# Patient Record
Sex: MALE | Race: WHITE | Employment: OTHER | URBAN - METROPOLITAN AREA
[De-identification: names, ages, dates, MRNs, and addresses within clinical notes are randomized per-mention and may not be internally consistent; named-entity substitution may affect disease eponyms.]

---

## 2018-02-02 ENCOUNTER — HOSPITAL ENCOUNTER (OUTPATIENT)
Dept: CT IMAGING | Age: 73
Discharge: HOME | End: 2018-02-04
Payer: MEDICARE

## 2018-02-02 ENCOUNTER — HOSPITAL ENCOUNTER (OUTPATIENT)
Age: 73
Discharge: HOME | End: 2018-02-02
Payer: MEDICARE

## 2018-02-02 DIAGNOSIS — R27.8 TRUNCAL ATAXIA: ICD-10-CM

## 2018-02-02 DIAGNOSIS — G93.40 ENCEPHALOPATHY: ICD-10-CM

## 2018-02-02 DIAGNOSIS — I95.1 ORTHOSTASIS: ICD-10-CM

## 2018-02-02 DIAGNOSIS — I69.30 MULTI INFARCT STATE: ICD-10-CM

## 2018-02-02 LAB
FOLATE: >20 NG/ML
T. PALLIDUM, IGG: NONREACTIVE
THYROXINE, FREE: 1.13 NG/DL (ref 0.93–1.7)
TSH SERPL DL<=0.05 MIU/L-ACNC: 2.35 MIU/L (ref 0.3–5)
VITAMIN B-12: 628 PG/ML (ref 232–1245)

## 2018-02-02 PROCEDURE — 86780 TREPONEMA PALLIDUM: CPT

## 2018-02-02 PROCEDURE — 70450 CT HEAD/BRAIN W/O DYE: CPT

## 2018-02-02 PROCEDURE — 84443 ASSAY THYROID STIM HORMONE: CPT

## 2018-02-02 PROCEDURE — 82607 VITAMIN B-12: CPT

## 2018-02-02 PROCEDURE — 84439 ASSAY OF FREE THYROXINE: CPT

## 2018-02-02 PROCEDURE — 36415 COLL VENOUS BLD VENIPUNCTURE: CPT

## 2018-02-02 PROCEDURE — 82746 ASSAY OF FOLIC ACID SERUM: CPT

## 2018-03-07 ENCOUNTER — APPOINTMENT (OUTPATIENT)
Dept: GENERAL RADIOLOGY | Age: 73
DRG: 561 | End: 2018-03-07
Payer: MEDICARE

## 2018-03-07 ENCOUNTER — APPOINTMENT (OUTPATIENT)
Dept: CT IMAGING | Age: 73
DRG: 561 | End: 2018-03-07
Payer: MEDICARE

## 2018-03-07 ENCOUNTER — HOSPITAL ENCOUNTER (INPATIENT)
Age: 73
LOS: 2 days | Discharge: HOME | DRG: 561 | End: 2018-03-10
Attending: EMERGENCY MEDICINE | Admitting: SURGERY
Payer: MEDICARE

## 2018-03-07 DIAGNOSIS — S09.90XA CLOSED HEAD INJURY, INITIAL ENCOUNTER: ICD-10-CM

## 2018-03-07 DIAGNOSIS — S42.201A CLOSED FRACTURE OF PROXIMAL END OF RIGHT HUMERUS, UNSPECIFIED FRACTURE MORPHOLOGY, INITIAL ENCOUNTER: Primary | ICD-10-CM

## 2018-03-07 DIAGNOSIS — R55 SYNCOPE AND COLLAPSE: ICD-10-CM

## 2018-03-07 DIAGNOSIS — I48.91 ATRIAL FIBRILLATION, UNSPECIFIED TYPE (HCC): ICD-10-CM

## 2018-03-07 DIAGNOSIS — S42.291A FRACTURE OF HUMERAL HEAD, CLOSED, RIGHT, INITIAL ENCOUNTER: ICD-10-CM

## 2018-03-07 PROBLEM — S42.291P: Status: ACTIVE | Noted: 2018-03-07

## 2018-03-07 LAB
ABSOLUTE EOS #: 0.4 K/UL (ref 0–0.4)
ABSOLUTE IMMATURE GRANULOCYTE: ABNORMAL K/UL (ref 0–0.3)
ABSOLUTE LYMPH #: 1.5 K/UL (ref 1–4.8)
ABSOLUTE MONO #: 1 K/UL (ref 0.1–1.3)
ANION GAP SERPL CALCULATED.3IONS-SCNC: 14 MMOL/L (ref 9–17)
BASOPHILS # BLD: 1 % (ref 0–2)
BASOPHILS ABSOLUTE: 0.1 K/UL (ref 0–0.2)
BUN BLDV-MCNC: 15 MG/DL (ref 8–23)
BUN/CREAT BLD: ABNORMAL (ref 9–20)
CALCIUM SERPL-MCNC: 10.2 MG/DL (ref 8.6–10.4)
CHLORIDE BLD-SCNC: 101 MMOL/L (ref 98–107)
CO2: 26 MMOL/L (ref 20–31)
CREAT SERPL-MCNC: 0.93 MG/DL (ref 0.7–1.2)
DIFFERENTIAL TYPE: ABNORMAL
EKG ATRIAL RATE: 57 BPM
EKG Q-T INTERVAL: 418 MS
EKG QRS DURATION: 96 MS
EKG QTC CALCULATION (BAZETT): 424 MS
EKG R AXIS: 28 DEGREES
EKG T AXIS: 43 DEGREES
EKG VENTRICULAR RATE: 62 BPM
EOSINOPHILS RELATIVE PERCENT: 3 % (ref 0–4)
GFR AFRICAN AMERICAN: >60 ML/MIN
GFR NON-AFRICAN AMERICAN: >60 ML/MIN
GFR SERPL CREATININE-BSD FRML MDRD: ABNORMAL ML/MIN/{1.73_M2}
GFR SERPL CREATININE-BSD FRML MDRD: ABNORMAL ML/MIN/{1.73_M2}
GLUCOSE BLD-MCNC: 116 MG/DL (ref 70–99)
HCT VFR BLD CALC: 44.4 % (ref 41–53)
HEMOGLOBIN: 14.9 G/DL (ref 13.5–17.5)
IMMATURE GRANULOCYTES: ABNORMAL %
LYMPHOCYTES # BLD: 11 % (ref 24–44)
MCH RBC QN AUTO: 31.5 PG (ref 26–34)
MCHC RBC AUTO-ENTMCNC: 33.5 G/DL (ref 31–37)
MCV RBC AUTO: 93.9 FL (ref 80–100)
MONOCYTES # BLD: 7 % (ref 1–7)
NRBC AUTOMATED: ABNORMAL PER 100 WBC
PDW BLD-RTO: 13.2 % (ref 11.5–14.9)
PLATELET # BLD: 338 K/UL (ref 150–450)
PLATELET ESTIMATE: ABNORMAL
PMV BLD AUTO: 8.8 FL (ref 6–12)
POTASSIUM SERPL-SCNC: 4 MMOL/L (ref 3.7–5.3)
RBC # BLD: 4.73 M/UL (ref 4.5–5.9)
RBC # BLD: ABNORMAL 10*6/UL
SEG NEUTROPHILS: 78 % (ref 36–66)
SEGMENTED NEUTROPHILS ABSOLUTE COUNT: 10.8 K/UL (ref 1.3–9.1)
SODIUM BLD-SCNC: 141 MMOL/L (ref 135–144)
TROPONIN INTERP: NORMAL
TROPONIN T: <0.03 NG/ML
WBC # BLD: 13.7 K/UL (ref 3.5–11)
WBC # BLD: ABNORMAL 10*3/UL

## 2018-03-07 PROCEDURE — 6360000002 HC RX W HCPCS: Performed by: EMERGENCY MEDICINE

## 2018-03-07 PROCEDURE — G0378 HOSPITAL OBSERVATION PER HR: HCPCS

## 2018-03-07 PROCEDURE — 84484 ASSAY OF TROPONIN QUANT: CPT

## 2018-03-07 PROCEDURE — 73060 X-RAY EXAM OF HUMERUS: CPT

## 2018-03-07 PROCEDURE — 6370000000 HC RX 637 (ALT 250 FOR IP): Performed by: SURGERY

## 2018-03-07 PROCEDURE — 2580000003 HC RX 258: Performed by: SURGERY

## 2018-03-07 PROCEDURE — 93005 ELECTROCARDIOGRAM TRACING: CPT

## 2018-03-07 PROCEDURE — 70450 CT HEAD/BRAIN W/O DYE: CPT

## 2018-03-07 PROCEDURE — 85025 COMPLETE CBC W/AUTO DIFF WBC: CPT

## 2018-03-07 PROCEDURE — 71045 X-RAY EXAM CHEST 1 VIEW: CPT

## 2018-03-07 PROCEDURE — 96374 THER/PROPH/DIAG INJ IV PUSH: CPT

## 2018-03-07 PROCEDURE — 99285 EMERGENCY DEPT VISIT HI MDM: CPT

## 2018-03-07 PROCEDURE — 36415 COLL VENOUS BLD VENIPUNCTURE: CPT

## 2018-03-07 PROCEDURE — 80048 BASIC METABOLIC PNL TOTAL CA: CPT

## 2018-03-07 RX ORDER — METOPROLOL TARTRATE 50 MG/1
50 TABLET, FILM COATED ORAL 2 TIMES DAILY
Status: DISCONTINUED | OUTPATIENT
Start: 2018-03-07 | End: 2018-03-07 | Stop reason: CLARIF

## 2018-03-07 RX ORDER — SENNOSIDES 8.6 MG
1300 CAPSULE ORAL 2 TIMES DAILY
Status: DISCONTINUED | OUTPATIENT
Start: 2018-03-07 | End: 2018-03-07

## 2018-03-07 RX ORDER — SODIUM CHLORIDE 0.9 % (FLUSH) 0.9 %
10 SYRINGE (ML) INJECTION EVERY 12 HOURS SCHEDULED
Status: DISCONTINUED | OUTPATIENT
Start: 2018-03-07 | End: 2018-03-10 | Stop reason: HOSPADM

## 2018-03-07 RX ORDER — LOSARTAN POTASSIUM AND HYDROCHLOROTHIAZIDE 12.5; 1 MG/1; MG/1
1 TABLET ORAL DAILY
Status: DISCONTINUED | OUTPATIENT
Start: 2018-03-07 | End: 2018-03-10 | Stop reason: HOSPADM

## 2018-03-07 RX ORDER — ASPIRIN 325 MG
162 TABLET ORAL ONCE
Status: DISCONTINUED | OUTPATIENT
Start: 2018-03-07 | End: 2018-03-07 | Stop reason: CLARIF

## 2018-03-07 RX ORDER — ATORVASTATIN CALCIUM 20 MG/1
20 TABLET, FILM COATED ORAL DAILY
Status: DISCONTINUED | OUTPATIENT
Start: 2018-03-07 | End: 2018-03-10 | Stop reason: HOSPADM

## 2018-03-07 RX ORDER — HYDROCODONE BITARTRATE AND ACETAMINOPHEN 5; 325 MG/1; MG/1
2 TABLET ORAL EVERY 4 HOURS PRN
Status: DISCONTINUED | OUTPATIENT
Start: 2018-03-07 | End: 2018-03-10 | Stop reason: HOSPADM

## 2018-03-07 RX ORDER — HYDROCODONE BITARTRATE AND ACETAMINOPHEN 5; 325 MG/1; MG/1
1 TABLET ORAL EVERY 4 HOURS PRN
Status: DISCONTINUED | OUTPATIENT
Start: 2018-03-07 | End: 2018-03-10 | Stop reason: HOSPADM

## 2018-03-07 RX ORDER — M-VIT,TX,IRON,MINS/CALC/FOLIC 27MG-0.4MG
1 TABLET ORAL DAILY
Status: DISCONTINUED | OUTPATIENT
Start: 2018-03-07 | End: 2018-03-10 | Stop reason: HOSPADM

## 2018-03-07 RX ORDER — ACETAMINOPHEN 325 MG/1
650 TABLET ORAL EVERY 4 HOURS PRN
Status: DISCONTINUED | OUTPATIENT
Start: 2018-03-07 | End: 2018-03-10 | Stop reason: HOSPADM

## 2018-03-07 RX ORDER — ASPIRIN 81 MG/1
162 TABLET ORAL DAILY
Status: DISCONTINUED | OUTPATIENT
Start: 2018-03-08 | End: 2018-03-10 | Stop reason: HOSPADM

## 2018-03-07 RX ORDER — HYDROCHLOROTHIAZIDE 25 MG/1
12.5 TABLET ORAL DAILY
Status: DISCONTINUED | OUTPATIENT
Start: 2018-03-07 | End: 2018-03-07 | Stop reason: CLARIF

## 2018-03-07 RX ORDER — LOSARTAN POTASSIUM AND HYDROCHLOROTHIAZIDE 12.5; 1 MG/1; MG/1
1 TABLET ORAL DAILY
Status: DISCONTINUED | OUTPATIENT
Start: 2018-03-07 | End: 2018-03-07 | Stop reason: CLARIF

## 2018-03-07 RX ORDER — SENNOSIDES 8.6 MG
1300 CAPSULE ORAL 2 TIMES DAILY
Status: DISCONTINUED | OUTPATIENT
Start: 2018-03-07 | End: 2018-03-10 | Stop reason: HOSPADM

## 2018-03-07 RX ORDER — CALCIUM CARBONATE 200(500)MG
1 TABLET,CHEWABLE ORAL 2 TIMES DAILY PRN
Status: DISCONTINUED | OUTPATIENT
Start: 2018-03-07 | End: 2018-03-10 | Stop reason: HOSPADM

## 2018-03-07 RX ORDER — METOPROLOL TARTRATE 100 MG/1
50 TABLET ORAL 2 TIMES DAILY
Status: DISCONTINUED | OUTPATIENT
Start: 2018-03-07 | End: 2018-03-10 | Stop reason: HOSPADM

## 2018-03-07 RX ORDER — AMLODIPINE BESYLATE AND ATORVASTATIN CALCIUM 10; 20 MG/1; MG/1
1 TABLET, FILM COATED ORAL DAILY
Status: DISCONTINUED | OUTPATIENT
Start: 2018-03-07 | End: 2018-03-07 | Stop reason: CLARIF

## 2018-03-07 RX ORDER — SODIUM CHLORIDE 0.9 % (FLUSH) 0.9 %
10 SYRINGE (ML) INJECTION PRN
Status: DISCONTINUED | OUTPATIENT
Start: 2018-03-07 | End: 2018-03-10 | Stop reason: HOSPADM

## 2018-03-07 RX ORDER — AMLODIPINE BESYLATE 10 MG/1
10 TABLET ORAL DAILY
Status: DISCONTINUED | OUTPATIENT
Start: 2018-03-07 | End: 2018-03-10 | Stop reason: HOSPADM

## 2018-03-07 RX ORDER — LOSARTAN POTASSIUM 100 MG/1
100 TABLET ORAL DAILY
Status: DISCONTINUED | OUTPATIENT
Start: 2018-03-07 | End: 2018-03-07 | Stop reason: CLARIF

## 2018-03-07 RX ADMIN — Medication 10 ML: at 21:44

## 2018-03-07 RX ADMIN — HYDROCODONE BITARTRATE AND ACETAMINOPHEN 2 TABLET: 5; 325 TABLET ORAL at 16:28

## 2018-03-07 RX ADMIN — HYDROCODONE BITARTRATE AND ACETAMINOPHEN 2 TABLET: 5; 325 TABLET ORAL at 20:48

## 2018-03-07 RX ADMIN — HYDROMORPHONE HYDROCHLORIDE 0.5 MG: 1 INJECTION, SOLUTION INTRAMUSCULAR; INTRAVENOUS; SUBCUTANEOUS at 12:56

## 2018-03-07 RX ADMIN — LOSARTAN POTASSIUM AND HYDROCHLOROTHIAZIDE 1 TABLET: 12.5; 1 TABLET ORAL at 21:47

## 2018-03-07 RX ADMIN — METOPROLOL TARTRATE 50 MG: 100 TABLET ORAL at 21:47

## 2018-03-07 ASSESSMENT — PAIN SCALES - GENERAL
PAINLEVEL_OUTOF10: 9
PAINLEVEL_OUTOF10: 2
PAINLEVEL_OUTOF10: 3
PAINLEVEL_OUTOF10: 5
PAINLEVEL_OUTOF10: 3
PAINLEVEL_OUTOF10: 7
PAINLEVEL_OUTOF10: 2

## 2018-03-07 ASSESSMENT — PAIN DESCRIPTION - PAIN TYPE
TYPE: ACUTE PAIN

## 2018-03-07 ASSESSMENT — PAIN DESCRIPTION - LOCATION
LOCATION: SHOULDER

## 2018-03-07 ASSESSMENT — PAIN DESCRIPTION - ORIENTATION
ORIENTATION: RIGHT

## 2018-03-07 ASSESSMENT — PAIN DESCRIPTION - DESCRIPTORS
DESCRIPTORS: ACHING;DULL
DESCRIPTORS: ACHING

## 2018-03-07 NOTE — ED NOTES
Bed: 12  Expected date:   Expected time:   Means of arrival:   Comments:  Carol Goltz, RN  03/07/18 112

## 2018-03-07 NOTE — ED NOTES
Report given to [facility], [address] [facility]. Pt is going to room 6071.      Ilana Valles RN  03/07/18 8302

## 2018-03-07 NOTE — ED PROVIDER NOTES
tablet Take 162 mg by mouth once       amLODIPine-atorvastatatin (CADUET) 10-20 MG per tablet Take 1 tablet by mouth daily. losartan-hydrochlorothiazide (HYZAAR) 100-12.5 MG per tablet Take 1 tablet by mouth daily. MULTIPLE VITAMINS PO Take 1 tablet by mouth daily. methylPREDNISolone (MEDROL DOSEPACK) 4 MG tablet Take 4 mg by mouth daily Use as directed, dose pack      traMADol (ULTRAM) 50 MG tablet Take 50 mg by mouth daily as needed for Pain. GABAPENTIN PO   Take 200 mg by mouth daily Indications: can have up to 300mg if he needs it       acetaminophen (TYLENOL ARTHRITIS PAIN) 650 MG CR tablet Take 1,300 mg by mouth 2 times daily       calcium carbonate (TUMS) 500 MG chewable tablet Take 1 tablet by mouth 2 times daily as needed for Heartburn. tamsulosin (FLOMAX) 0.4 MG capsule Take 1 capsule by mouth daily. Qty: 30 capsule, Refills: 11    Associated Diagnoses: Incontinence; Nocturia; Frequency; BPH (benign prostatic hyperplasia)           ALLERGIES     is allergic to morphine. FAMILY HISTORY     has no family status information on file. SOCIAL HISTORY      reports that he quit smoking about 34 years ago. He smoked 1.00 pack per day. He has never used smokeless tobacco. He reports that he does not drink alcohol or use drugs. PHYSICAL EXAM     INITIAL VITALS: /67   Pulse 91   Temp 97.3 °F (36.3 °C) (Oral)   Resp 18   Ht 5' 11\" (1.803 m)   Wt 259 lb 14.8 oz (117.9 kg)   SpO2 91%   BMI 36.25 kg/m²    Physical Exam   Constitutional: He is oriented to person, place, and time. He appears well-developed and well-nourished. No distress. HENT:   Head: Normocephalic and atraumatic. Nose: Nose normal.   Mouth/Throat: Oropharynx is clear and moist.   Eyes: Conjunctivae and EOM are normal. Pupils are equal, round, and reactive to light. Neck: Normal range of motion. Neck supple. Cardiovascular: Normal heart sounds and intact distal pulses.   Exam reveals no gallop and no friction rub. No murmur heard. Irregular rate and rhythm    Pulmonary/Chest: Effort normal and breath sounds normal. No stridor. No respiratory distress. He has no wheezes. He has no rales. He exhibits no tenderness. Abdominal: Soft. Bowel sounds are normal. He exhibits no distension. There is no tenderness. There is no rebound and no guarding. Musculoskeletal: Normal range of motion. He exhibits no edema, tenderness or deformity. Arms:  Neurological: He is alert and oriented to person, place, and time. No cranial nerve deficit. Skin: Skin is warm and dry. No rash noted. He is not diaphoretic. No erythema. Psychiatric: He has a normal mood and affect. His behavior is normal. Judgment and thought content normal.   Nursing note and vitals reviewed. MEDICAL DECISION MAKING:   [facility]    Patient without evidence of defensive injuries. Presentation in keeping with a syncopal event. 1:10 PM  Spoke with Dr Verner Garrison who accepts pt. Pt states that his pain is well controlled. Procedures    DIAGNOSTIC RESULTS   EKG: All EKG's are interpreted by the Emergency Department Physician who either signs or Co-signs this chart in the absence of a cardiologist.      Afib @ 62, Low voltage QRS 96, Qtc 424. Abnormal EKG       RADIOLOGY:All plain film, CT, MRI, and formal ultrasound images (except ED bedside ultrasound) are read by the radiologist, see reports below, unless otherwise noted in MDM or here. CT HEAD WO CONTRAST   Final Result   No acute intracranial abnormality. 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 XR HUMERUS RIGHT (MIN 2 VIEWS)   Final Result   Nondisplaced acute oblique fracture through the proximal humerus in a patient   with total shoulder arthroplasty. XR CHEST (SINGLE VIEW FRONTAL)   Final Result   No acute cardiopulmonary process. LABS: All lab results were reviewed by myself, and all abnormals are listed below.   Labs Reviewed

## 2018-03-07 NOTE — PROGRESS NOTES
ADMISSION NOTE       Patient admitted to room  2098. Time of admit:  Rebel Francisco from:  ER    Reason for admission:  Fractured humerus     Where patient has been residing for the last 24 hrs:  Private residence     Has the patient been admitted to any facility in the last 4 weeks, which one:  no    Family at bedside:  Yes, wife Shell Holcomb    Patient is currently resting in bed, vitals obtained, telemetry placed on pt. No distress noted. Patient has been oriented to room, educated on how to use call light, and to call for assistance prior to getting up. Bed in lowest and locked position. 2 siderails up for safety. Call light within reach.

## 2018-03-08 ENCOUNTER — APPOINTMENT (OUTPATIENT)
Dept: CT IMAGING | Age: 73
DRG: 561 | End: 2018-03-08
Payer: MEDICARE

## 2018-03-08 PROBLEM — I48.20 CHRONIC A-FIB (HCC): Status: ACTIVE | Noted: 2018-03-08

## 2018-03-08 PROBLEM — S42.301A CLOSED RIGHT HUMERAL FRACTURE: Status: ACTIVE | Noted: 2018-03-07

## 2018-03-08 PROBLEM — I25.10 CORONARY ARTERY DISEASE INVOLVING NATIVE HEART WITHOUT ANGINA PECTORIS: Status: ACTIVE | Noted: 2018-03-08

## 2018-03-08 PROBLEM — S42.291A FRACTURE OF HUMERAL HEAD, CLOSED, RIGHT, CLOSED, INITIAL ENCOUNTER: Status: ACTIVE | Noted: 2018-03-08

## 2018-03-08 PROBLEM — I10 ESSENTIAL HYPERTENSION: Status: ACTIVE | Noted: 2018-03-08

## 2018-03-08 PROBLEM — Z86.79 HISTORY OF CAROTID ARTERY DISEASE: Status: ACTIVE | Noted: 2018-03-08

## 2018-03-08 PROBLEM — R91.1 PULMONARY NODULE: Status: ACTIVE | Noted: 2018-03-08

## 2018-03-08 PROCEDURE — 6370000000 HC RX 637 (ALT 250 FOR IP): Performed by: FAMILY MEDICINE

## 2018-03-08 PROCEDURE — G0378 HOSPITAL OBSERVATION PER HR: HCPCS

## 2018-03-08 PROCEDURE — 96375 TX/PRO/DX INJ NEW DRUG ADDON: CPT

## 2018-03-08 PROCEDURE — 6360000002 HC RX W HCPCS: Performed by: SURGERY

## 2018-03-08 PROCEDURE — 23600 CLTX PROX HUMRL FX W/O MNPJ: CPT | Performed by: ORTHOPAEDIC SURGERY

## 2018-03-08 PROCEDURE — 73200 CT UPPER EXTREMITY W/O DYE: CPT

## 2018-03-08 PROCEDURE — 6370000000 HC RX 637 (ALT 250 FOR IP): Performed by: SURGERY

## 2018-03-08 PROCEDURE — 1200000000 HC SEMI PRIVATE

## 2018-03-08 PROCEDURE — 6360000002 HC RX W HCPCS: Performed by: FAMILY MEDICINE

## 2018-03-08 PROCEDURE — 99221 1ST HOSP IP/OBS SF/LOW 40: CPT | Performed by: ORTHOPAEDIC SURGERY

## 2018-03-08 PROCEDURE — 96376 TX/PRO/DX INJ SAME DRUG ADON: CPT

## 2018-03-08 PROCEDURE — 2580000003 HC RX 258: Performed by: SURGERY

## 2018-03-08 RX ORDER — KETOROLAC TROMETHAMINE 30 MG/ML
30 INJECTION, SOLUTION INTRAMUSCULAR; INTRAVENOUS EVERY 6 HOURS PRN
Status: DISCONTINUED | OUTPATIENT
Start: 2018-03-08 | End: 2018-03-10 | Stop reason: HOSPADM

## 2018-03-08 RX ORDER — ONDANSETRON 2 MG/ML
4 INJECTION INTRAMUSCULAR; INTRAVENOUS EVERY 6 HOURS PRN
Status: DISCONTINUED | OUTPATIENT
Start: 2018-03-08 | End: 2018-03-10 | Stop reason: HOSPADM

## 2018-03-08 RX ORDER — FAMOTIDINE 20 MG/1
20 TABLET, FILM COATED ORAL 2 TIMES DAILY
Status: DISCONTINUED | OUTPATIENT
Start: 2018-03-08 | End: 2018-03-10 | Stop reason: HOSPADM

## 2018-03-08 RX ADMIN — ONDANSETRON 4 MG: 2 INJECTION INTRAMUSCULAR; INTRAVENOUS at 03:51

## 2018-03-08 RX ADMIN — KETOROLAC TROMETHAMINE 30 MG: 30 INJECTION, SOLUTION INTRAMUSCULAR; INTRAVENOUS at 16:26

## 2018-03-08 RX ADMIN — ASPIRIN 162 MG: 81 TABLET ORAL at 13:14

## 2018-03-08 RX ADMIN — HYDROCODONE BITARTRATE AND ACETAMINOPHEN 2 TABLET: 5; 325 TABLET ORAL at 01:39

## 2018-03-08 RX ADMIN — LOSARTAN POTASSIUM AND HYDROCHLOROTHIAZIDE 1 TABLET: 12.5; 1 TABLET ORAL at 13:15

## 2018-03-08 RX ADMIN — Medication 1300 MG: at 20:35

## 2018-03-08 RX ADMIN — Medication 10 ML: at 20:33

## 2018-03-08 RX ADMIN — METOPROLOL TARTRATE 50 MG: 100 TABLET ORAL at 20:33

## 2018-03-08 RX ADMIN — HYDROCODONE BITARTRATE AND ACETAMINOPHEN 2 TABLET: 5; 325 TABLET ORAL at 05:59

## 2018-03-08 RX ADMIN — AMLODIPINE BESYLATE 10 MG: 10 TABLET ORAL at 13:13

## 2018-03-08 RX ADMIN — FAMOTIDINE 20 MG: 20 TABLET, FILM COATED ORAL at 13:13

## 2018-03-08 RX ADMIN — FAMOTIDINE 20 MG: 20 TABLET, FILM COATED ORAL at 20:33

## 2018-03-08 RX ADMIN — ONDANSETRON 4 MG: 2 INJECTION INTRAMUSCULAR; INTRAVENOUS at 10:58

## 2018-03-08 RX ADMIN — Medication 10 ML: at 10:58

## 2018-03-08 RX ADMIN — METOPROLOL TARTRATE 50 MG: 100 TABLET ORAL at 13:15

## 2018-03-08 RX ADMIN — ATORVASTATIN CALCIUM 20 MG: 20 TABLET, FILM COATED ORAL at 13:14

## 2018-03-08 ASSESSMENT — PAIN SCALES - GENERAL
PAINLEVEL_OUTOF10: 10
PAINLEVEL_OUTOF10: 0
PAINLEVEL_OUTOF10: 8
PAINLEVEL_OUTOF10: 4
PAINLEVEL_OUTOF10: 9
PAINLEVEL_OUTOF10: 3
PAINLEVEL_OUTOF10: 4

## 2018-03-08 ASSESSMENT — PAIN DESCRIPTION - ORIENTATION: ORIENTATION: RIGHT

## 2018-03-08 ASSESSMENT — PAIN DESCRIPTION - PAIN TYPE: TYPE: ACUTE PAIN

## 2018-03-08 ASSESSMENT — PAIN DESCRIPTION - LOCATION: LOCATION: SHOULDER

## 2018-03-08 ASSESSMENT — PAIN DESCRIPTION - DESCRIPTORS: DESCRIPTORS: ACHING;DULL

## 2018-03-08 NOTE — H&P
HISTORY and Tre[facility] MOHINDER [address]         NAME:  Sweta Henning  MRN: [id number]   YOB: 1945   Date: 3/8/2018   Age: 74 y.o. Gender: male       COMPLAINT AND PRESENT HISTORY:    74 y o female with \"trip over floor mat at [employer] station\". Fell to ground with pain in right shoulder. Patient denies preceding chest pain, shortness of breath, nausea or diaphoresis prior to fall. Pt complains only of Right shoulder pain, made worse with movement. No current pain if lying still, pain with movement to 8-10. DIAGNOSTIC RESULTS   Radiology:   CT HEAD WO CONTRAST   Final Result   No acute intracranial abnormality.       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           XR HUMERUS RIGHT (MIN 2 VIEWS)   Final Result   Nondisplaced acute oblique fracture through the proximal humerus in a patient   with total shoulder arthroplasty.           XR CHEST (SINGLE VIEW FRONTAL)   Final Result   No acute cardiopulmonary process.             EKG: Afib @ 62, Low voltage QRS 96, Qtc 424. Abnormal EKG         Labs:  CBC:   Recent Labs      03/07/18   1240   WBC  13.7*   HGB  14.9   PLT  338     BMP:    Recent Labs      03/07/18   1431   NA  141   K  4.0   CL  101   CO2  26   BUN  15   CREATININE  0.93   GLUCOSE  116*     Hepatic: No results for input(s): AST, ALT, ALB, BILITOT, ALKPHOS in the last 72 hours. CARDIAC ENZY:   Recent Labs      03/07/18   1431   TROPONINT  <0.03     INR: No results for input(s): INR in the last 72 hours. BNP: No results for input(s): BNP in the last 72 hours. ABGs: No results found for: PHART, PO2ART, EQP3TFM  Lipids: No results for input(s): CHOL, HDL in the last 72 hours.     Invalid input(s): LDLCALCU  U/A:  Lab Results   Component Value Date    COLORU YELLOW 07/01/2015    WBCUA 0 TO 2 07/01/2015    RBCUA 2 TO 5 07/01/2015    MUCUS NOT REPORTED 07/01/2015    BACTERIA NOT REPORTED 07/01/2015    SPECGRAV 1.014 07/01/2015    LEUKOCYTESUR NEGATIVE Reactions    Morphine Other (See Comments)     Combative         No current facility-administered medications on file prior to encounter. Current Outpatient Prescriptions on File Prior to Encounter   Medication Sig Dispense Refill    metoprolol (LOPRESSOR) 50 MG tablet Take 50 mg by mouth 2 times daily.  apixaban (ELIQUIS) 5 MG TABS tablet Take by mouth 2 times daily.  Melatonin 5 MG TABS tablet Take 1 tablet by mouth daily as needed.  aspirin 81 MG tablet Take 162 mg by mouth once       amLODIPine-atorvastatatin (CADUET) 10-20 MG per tablet Take 1 tablet by mouth daily.  losartan-hydrochlorothiazide (HYZAAR) 100-12.5 MG per tablet Take 1 tablet by mouth daily.  MULTIPLE VITAMINS PO Take 1 tablet by mouth daily.  methylPREDNISolone (MEDROL DOSEPACK) 4 MG tablet Take 4 mg by mouth daily Use as directed, dose pack      traMADol (ULTRAM) 50 MG tablet Take 50 mg by mouth daily as needed for Pain.  GABAPENTIN PO   Take 200 mg by mouth daily Indications: can have up to 300mg if he needs it       acetaminophen (TYLENOL ARTHRITIS PAIN) 650 MG CR tablet Take 1,300 mg by mouth 2 times daily       calcium carbonate (TUMS) 500 MG chewable tablet Take 1 tablet by mouth 2 times daily as needed for Heartburn.  tamsulosin (FLOMAX) 0.4 MG capsule Take 1 capsule by mouth daily. 30 capsule 11                      General health:  Fairly good. No fever or chills. Skin:  No itching, redness or rash. Head, eyes, ears, nose, throat:  No epistaxis, rhinorrhea hearing loss or sore throat. Headache,     Neck:  No pain, stiffness or masses. Cardiovascular/Respiratory system:  No chest pain, palpitation, shortness of breath, coughing or expectoration. Gastrointestinal tract: No abdominal pain, nausea, vomiting, diarrhea or constipation. Genitourinary:  No burning on micturition. No hesitancy, urgency, frequency or discoloration of urine.  Enlarged tongue protrudes centrally, no slurring of the speech. PROVISIONAL DIAGNOSES:      Active Problems:    Fracture of humeral head, closed, right, initial encounter    Fracture, humerus, head, right, with malunion, subsequent encounter  Resolved Problems:    * No resolved hospital problems.  *    Past Medical History:   Diagnosis Date    Arthritis     ED (erectile dysfunction)     Elevated PSA     Hypercholesterolemia     Hypertension     MI (myocardial infarction)        SHERIF WHITTAKER NP on 3/8/2018 at 8:31 AM

## 2018-03-08 NOTE — PROGRESS NOTES
History:     Past Surgical History:   Procedure Laterality Date    CAROTID ENDARTERECTOMY  1998    bilateral    CHOLECYSTECTOMY  2007    COLONOSCOPY  2-25-08    COLONOSCOPY  6 15 15    polyp,bx    CORONARY ARTERY BYPASS GRAFT  1992    3 vessel    ERCP      with stent    HERNIA REPAIR  1988    inguinal    HIP ARTHROSCOPY Left 2000    JOINT REPLACEMENT Right 1996    shoulder    KNEE ARTHROSCOPY Left 1981    LUMBAR LAMINECTOMY  1990    OK SONO GUIDE NEEDLE BIOPSY  1/8/13    PTCA  2004, 1984    TOTAL HIP ARTHROPLASTY  1990    TOTAL KNEE ARTHROPLASTY  1999    left        Medications Prior to Admission:       Prior to Admission medications    Medication Sig Start Date End Date Taking? Authorizing Provider   metoprolol (LOPRESSOR) 50 MG tablet Take 50 mg by mouth 2 times daily. Yes Historical Provider, MD   apixaban (ELIQUIS) 5 MG TABS tablet Take by mouth 2 times daily. Yes Historical Provider, MD   Melatonin 5 MG TABS tablet Take 1 tablet by mouth daily as needed. Yes Historical Provider, MD   aspirin 81 MG tablet Take 162 mg by mouth once    Yes Historical Provider, MD   amLODIPine-atorvastatatin (CADUET) 10-20 MG per tablet Take 1 tablet by mouth daily. Yes Historical Provider, MD   losartan-hydrochlorothiazide (HYZAAR) 100-12.5 MG per tablet Take 1 tablet by mouth daily. Yes Historical Provider, MD   MULTIPLE VITAMINS PO Take 1 tablet by mouth daily. Yes Historical Provider, MD   methylPREDNISolone (MEDROL DOSEPACK) 4 MG tablet Take 4 mg by mouth daily Use as directed, dose pack 10/7/15   Historical Provider, MD   traMADol (ULTRAM) 50 MG tablet Take 50 mg by mouth daily as needed for Pain.     Historical Provider, MD   GABAPENTIN PO   Take 200 mg by mouth daily Indications: can have up to 300mg if he needs it     Historical Provider, MD   acetaminophen (TYLENOL ARTHRITIS PAIN) 650 MG CR tablet Take 1,300 mg by mouth 2 times daily     Historical Provider, MD   calcium carbonate (TUMS) 500 MG chewable tablet Take 1 tablet by mouth 2 times daily as needed for Heartburn. Historical Provider, MD   tamsulosin (FLOMAX) 0.4 MG capsule Take 1 capsule by mouth daily. 4/20/15   Bayron Littlejohn MD        Allergies:       Morphine    Social History:     Tobacco:    reports that he quit smoking about 34 years ago. He smoked 1.00 pack per day. He has never used smokeless tobacco.  Alcohol:      reports that he does not drink alcohol. Drug Use:  reports that he does not use drugs.     Family History:     Family History   Problem Relation Age of Onset    Coronary Art Dis Father     Hypertension Father     Coronary Art Dis Mother     Hypertension Mother     Coronary Art Dis Brother     Hypertension Brother     Coronary Art Dis Sister     Hypertension Sister        Review of Systems:      All 10 systems reviewed and found negative except as noted in the HPI    Code Status:  Full Code    Physical Exam:     Vitals:  /62   Pulse 62   Temp 97.3 °F (36.3 °C) (Oral)   Resp 18   Ht 5' 11\" (1.803 m)   Wt 259 lb 10 oz (117.8 kg)   SpO2 92%   BMI 36.21 kg/m²   Temp (24hrs), Av.6 °F (36.4 °C), Min:97 °F (36.1 °C), Max:98.2 °F (36.8 °C)      Physical exam  GEN: AAOx3, NAD, obese  HEENT: NC/AT, mucus membrane moist, EOMI, PERRLA, no scleral icterus  NECK:  Supple, trachea is midline  CHEST:  Good air entry, no wheezes, no crackles  CVS:  RRR, S1 S2 present, no murmurs  ABD: +BS, soft, nontender, nondistended, no hepatosplenomegaly  NEURO: no focal weakness, CN 2-12 grossly intact, no gross motor deficit noted  MUSK: RUE: ROM limited, +tenderness, R arm sling noted  Extremities: no edema, redness, or calf tenderness  SKIN:  Dry, warm, intact  PSYCH: mood normal        Data:     Labs--Reviewed:  Recent Results (from the past 24 hour(s))   EKG 12 Lead    Collection Time: 18 11:45 AM   Result Value Ref Range    Ventricular Rate 62 BPM    Atrial Rate 57 BPM    QRS Duration 96 ms    Q-T Interval 418 ms

## 2018-03-08 NOTE — CONSULTS
nondisplaced long oblique fracture starting in the proximal humerus and extending down to just below the tip of his well fixed humeral stem. An anatomic total shoulder arthroplasty implant is noted. No obvious dislocation or subluxation of the joint is appreciated. Diagnostics and Labs  Relevant diagnostic, laboratory and radiological studies have been reviewed in the Electronic Medical Record. Impression and Plan  Valeria Sparks is a 74 y.o. old male with a closed right nondisplaced periprosthetic humeral fracture that starts approximately and extends to just below the tip of the implant. The implant itself appears to be well fixed. I had a discussion with the patient and his wife today with regards to the nature and extent of his injury and discussed treatment options available to him. Given the nature of this fracture I believe that we can treat this conservatively without the need for surgical intervention. Consequently I recommend that he remain in his sling for a week and then we will likely move him into a functional brace thereafter on an outpatient basis. In the meantime he is to rest this right upper extremity and shoulder, and ice as needed. He is to avoid any pushing, pulling, or lifting with this extremity. Thank you for involving me in the care of this patient, please call with any questions.

## 2018-03-09 PROCEDURE — 96376 TX/PRO/DX INJ SAME DRUG ADON: CPT

## 2018-03-09 PROCEDURE — 97530 THERAPEUTIC ACTIVITIES: CPT

## 2018-03-09 PROCEDURE — G8978 MOBILITY CURRENT STATUS: HCPCS

## 2018-03-09 PROCEDURE — 6370000000 HC RX 637 (ALT 250 FOR IP): Performed by: SURGERY

## 2018-03-09 PROCEDURE — 97166 OT EVAL MOD COMPLEX 45 MIN: CPT

## 2018-03-09 PROCEDURE — 6360000002 HC RX W HCPCS: Performed by: SURGERY

## 2018-03-09 PROCEDURE — 2580000003 HC RX 258: Performed by: SURGERY

## 2018-03-09 PROCEDURE — 97535 SELF CARE MNGMENT TRAINING: CPT

## 2018-03-09 PROCEDURE — G8979 MOBILITY GOAL STATUS: HCPCS

## 2018-03-09 PROCEDURE — 97116 GAIT TRAINING THERAPY: CPT

## 2018-03-09 PROCEDURE — G0378 HOSPITAL OBSERVATION PER HR: HCPCS

## 2018-03-09 PROCEDURE — 1200000000 HC SEMI PRIVATE

## 2018-03-09 PROCEDURE — 6370000000 HC RX 637 (ALT 250 FOR IP): Performed by: FAMILY MEDICINE

## 2018-03-09 PROCEDURE — 97162 PT EVAL MOD COMPLEX 30 MIN: CPT

## 2018-03-09 RX ORDER — HYDROCODONE BITARTRATE AND ACETAMINOPHEN 5; 325 MG/1; MG/1
1 TABLET ORAL EVERY 4 HOURS PRN
Qty: 30 TABLET | Refills: 0 | Status: SHIPPED | OUTPATIENT
Start: 2018-03-09 | End: 2018-03-10 | Stop reason: HOSPADM

## 2018-03-09 RX ADMIN — METOPROLOL TARTRATE 50 MG: 100 TABLET ORAL at 20:48

## 2018-03-09 RX ADMIN — Medication 10 ML: at 10:24

## 2018-03-09 RX ADMIN — ATORVASTATIN CALCIUM 20 MG: 20 TABLET, FILM COATED ORAL at 10:23

## 2018-03-09 RX ADMIN — Medication 1300 MG: at 10:23

## 2018-03-09 RX ADMIN — FAMOTIDINE 20 MG: 20 TABLET, FILM COATED ORAL at 20:54

## 2018-03-09 RX ADMIN — METOPROLOL TARTRATE 50 MG: 100 TABLET ORAL at 10:23

## 2018-03-09 RX ADMIN — AMLODIPINE BESYLATE 10 MG: 10 TABLET ORAL at 10:22

## 2018-03-09 RX ADMIN — FAMOTIDINE 20 MG: 20 TABLET, FILM COATED ORAL at 10:22

## 2018-03-09 RX ADMIN — Medication 10 ML: at 20:54

## 2018-03-09 RX ADMIN — MULTIPLE VITAMINS W/ MINERALS TAB 1 TABLET: TAB at 10:22

## 2018-03-09 RX ADMIN — LOSARTAN POTASSIUM AND HYDROCHLOROTHIAZIDE 1 TABLET: 12.5; 1 TABLET ORAL at 10:20

## 2018-03-09 RX ADMIN — Medication 1300 MG: at 20:47

## 2018-03-09 RX ADMIN — ACETAMINOPHEN 650 MG: 325 TABLET, FILM COATED ORAL at 10:22

## 2018-03-09 RX ADMIN — ASPIRIN 162 MG: 81 TABLET ORAL at 10:23

## 2018-03-09 RX ADMIN — KETOROLAC TROMETHAMINE 30 MG: 30 INJECTION, SOLUTION INTRAMUSCULAR; INTRAVENOUS at 21:04

## 2018-03-09 ASSESSMENT — PAIN DESCRIPTION - FREQUENCY: FREQUENCY: INTERMITTENT

## 2018-03-09 ASSESSMENT — PAIN SCALES - GENERAL
PAINLEVEL_OUTOF10: 0
PAINLEVEL_OUTOF10: 8
PAINLEVEL_OUTOF10: 4
PAINLEVEL_OUTOF10: 5
PAINLEVEL_OUTOF10: 8
PAINLEVEL_OUTOF10: 0

## 2018-03-09 ASSESSMENT — PAIN DESCRIPTION - ONSET: ONSET: ON-GOING

## 2018-03-09 ASSESSMENT — PAIN DESCRIPTION - PAIN TYPE
TYPE: ACUTE PAIN
TYPE: ACUTE PAIN

## 2018-03-09 ASSESSMENT — PAIN DESCRIPTION - ORIENTATION
ORIENTATION: RIGHT
ORIENTATION: RIGHT

## 2018-03-09 ASSESSMENT — PAIN DESCRIPTION - LOCATION
LOCATION: SHOULDER
LOCATION: ARM

## 2018-03-09 ASSESSMENT — PAIN DESCRIPTION - PROGRESSION: CLINICAL_PROGRESSION: RAPIDLY WORSENING

## 2018-03-09 ASSESSMENT — PAIN DESCRIPTION - DESCRIPTORS
DESCRIPTORS: SHARP;SHOOTING
DESCRIPTORS: SHARP;SHOOTING

## 2018-03-09 NOTE — PROGRESS NOTES
Physical Therapy    Facility/Department: [facility] MED SURG  Initial Assessment    NAME: Connor Wells  : 1945  MRN: [id number]    Date of Service: 3/9/2018    Patient Diagnosis(es): The primary encounter diagnosis was Closed fracture of proximal end of right humerus, unspecified fracture morphology, initial encounter. Diagnoses of Closed head injury, initial encounter, Syncope and collapse, Atrial fibrillation, unspecified type ([address] and Fracture of humeral head, closed, right, initial encounter were also pertinent to this visit. has a past medical history of Arthritis; ED (erectile dysfunction); Elevated PSA; Hypercholesterolemia; Hypertension; MI (myocardial infarction); and Occult blood in stools. has a past surgical history that includes pr sono guide needle biopsy (13); Knee arthroscopy (Left, ); Carotid endarterectomy (); Coronary artery bypass graft (); Hip arthroscopy (Left, ); Total hip arthroplasty (); hernia repair (); lumbar laminectomy (); Percutaneous Transluminal Coronary Angio (, ); Total knee arthroplasty (); joint replacement (Right, ); ERCP; Colonoscopy (08); Cholecystectomy (); and Colonoscopy (6 15 15). Restrictions  Restrictions/Precautions  Restrictions/Precautions: Fall Risk  Required Braces or Orthoses?: Yes (sling right arm)  Implants present? : Metal implants (right shoulder replacement, left THR, left TKR, sternal wires, cardiac stents)  Required Braces or Orthoses  Right Upper Extremity Brace/Splint: Sling  Position Activity Restriction  Other position/activity restrictions:  Pt is to avoid pushing, pulling or lifting w/ right hand. Remain in sling x 1 week.   Vision/Hearing  Vision: Impaired  Vision Exceptions: Wears glasses at all times  Hearing: Exceptions to [facility]  Hearing Exceptions: Bilateral hearing aid;Hard of hearing/hearing concerns     Subjective  General  Patient assessed for rehabilitation services?: Patient goals : return home w/ spouse       Therapy Time   Individual Concurrent Group Co-treatment   Time In 1253         Time Out 1343         Minutes [address], PT

## 2018-03-09 NOTE — PROGRESS NOTES
Status: WFL  Sensation  Overall Sensation Status: WFL (denies)   ADL  Feeding: Setup, Verbal cueing, Increased time to complete (Difficulty using utensils w/L hand (non-dominant))  UE Bathing: Moderate assistance, Maximum assistance  LE Bathing: Moderate assistance, Maximum assistance  UE Dressing: Maximum assistance, Dependent/Total  LE Dressing: Dependent/Total  Toileting: Dependent/Total  Additional Comments: Pt's spouse reports that she will be providing A at discharge and is confident that she will be able to manage. Pt has had multiple orthopedic surgeries and spouse has provided necessary A. Pt and spouse educated on chris-techniques for UB ADL's to decrease pain and maintain current restrictions. Pt with poor reception of education but spouse V/D good understanding. UE Function  Hand Dominance  Hand Dominance: Right     LUE Strength  Gross LUE Strength: WFL  L Hand Grasp: 5/5     LUE Tone: Normotonic     LUE AROM (degrees)  LUE AROM : WFL  LUE General AROM: Some limitations in shoulder but pt/spouse report this is premorbid but may also be assoicated with L rib pain from fall. Left Hand AROM (degrees)  Left Hand AROM: WFL  RUE Strength  Gross RUE Strength: Exceptions to [facility]  R Hand Grasp: 5/5  RUE Strength Comment: Shoulder/elbow NT      RUE Tone: Normotonic     RUE AROM (degrees)  RUE AROM : Exceptions  RUE General AROM: Per ortho pt is to remain in sling x1 week. Shoulder NT. Elbow limited 2* associated pain in shoulder. Right Hand AROM (degrees)  Right Hand AROM: WFL    Fine Motor Skills  Coordination  Movements Are Fluid And Coordinated: No  Coordination and Movement description: Gross motor impairments, Right UE (RUE in sling)     Mobility  Sit to Supine: Minimal assistance   Stand Pivot Transfers: Minimal assistance   Balance  Sitting Balance: Supervision  Standing Balance: Minimal assistance (for dynamic tasks; SBA/CGA for static stdg)  Standing Balance  Sit to stand:  Moderate

## 2018-03-09 NOTE — PROGRESS NOTES
[facility]   OCCUPATIONAL THERAPY MISSED TREATMENT NOTE   INPATIENT   Date: 3/9/18  Patient Name: Thaddeus Baron       Room: [facility]/7825-87  MRN: [id number]   Account #: [de-identified]    : 1945  (69 y.o.)  Gender: male      REASON FOR MISSED TREATMENT:  OT evaluation deferred, awaiting ortho consult by Dr. Bismark Mary

## 2018-03-10 VITALS
SYSTOLIC BLOOD PRESSURE: 129 MMHG | DIASTOLIC BLOOD PRESSURE: 65 MMHG | BODY MASS INDEX: 35.59 KG/M2 | TEMPERATURE: 97.5 F | WEIGHT: 254.19 LBS | RESPIRATION RATE: 16 BRPM | HEART RATE: 65 BPM | HEIGHT: 71 IN | OXYGEN SATURATION: 92 %

## 2018-03-10 PROCEDURE — 97116 GAIT TRAINING THERAPY: CPT

## 2018-03-10 PROCEDURE — 6360000002 HC RX W HCPCS: Performed by: SURGERY

## 2018-03-10 PROCEDURE — 97110 THERAPEUTIC EXERCISES: CPT

## 2018-03-10 PROCEDURE — 6370000000 HC RX 637 (ALT 250 FOR IP): Performed by: SURGERY

## 2018-03-10 PROCEDURE — G0378 HOSPITAL OBSERVATION PER HR: HCPCS

## 2018-03-10 PROCEDURE — 2580000003 HC RX 258: Performed by: SURGERY

## 2018-03-10 PROCEDURE — 96376 TX/PRO/DX INJ SAME DRUG ADON: CPT

## 2018-03-10 PROCEDURE — 97530 THERAPEUTIC ACTIVITIES: CPT

## 2018-03-10 PROCEDURE — 6370000000 HC RX 637 (ALT 250 FOR IP): Performed by: FAMILY MEDICINE

## 2018-03-10 RX ORDER — TRAMADOL HYDROCHLORIDE 50 MG/1
50 TABLET ORAL EVERY 6 HOURS PRN
Qty: 30 TABLET | Refills: 0 | Status: SHIPPED | OUTPATIENT
Start: 2018-03-10 | End: 2018-03-17

## 2018-03-10 RX ADMIN — Medication 10 ML: at 10:11

## 2018-03-10 RX ADMIN — LOSARTAN POTASSIUM AND HYDROCHLOROTHIAZIDE 1 TABLET: 12.5; 1 TABLET ORAL at 10:11

## 2018-03-10 RX ADMIN — MULTIPLE VITAMINS W/ MINERALS TAB 1 TABLET: TAB at 10:09

## 2018-03-10 RX ADMIN — AMLODIPINE BESYLATE 10 MG: 10 TABLET ORAL at 10:11

## 2018-03-10 RX ADMIN — KETOROLAC TROMETHAMINE 30 MG: 30 INJECTION, SOLUTION INTRAMUSCULAR; INTRAVENOUS at 05:09

## 2018-03-10 RX ADMIN — ATORVASTATIN CALCIUM 20 MG: 20 TABLET, FILM COATED ORAL at 10:11

## 2018-03-10 RX ADMIN — METOPROLOL TARTRATE 50 MG: 100 TABLET ORAL at 10:10

## 2018-03-10 RX ADMIN — ASPIRIN 162 MG: 81 TABLET ORAL at 10:11

## 2018-03-10 RX ADMIN — FAMOTIDINE 20 MG: 20 TABLET, FILM COATED ORAL at 10:09

## 2018-03-10 RX ADMIN — ACETAMINOPHEN 650 MG: 325 TABLET, FILM COATED ORAL at 10:15

## 2018-03-10 ASSESSMENT — PAIN DESCRIPTION - PAIN TYPE: TYPE: ACUTE PAIN

## 2018-03-10 ASSESSMENT — PAIN DESCRIPTION - FREQUENCY: FREQUENCY: INTERMITTENT

## 2018-03-10 ASSESSMENT — PAIN SCALES - GENERAL
PAINLEVEL_OUTOF10: 6
PAINLEVEL_OUTOF10: 6
PAINLEVEL_OUTOF10: 8

## 2018-03-10 ASSESSMENT — PAIN DESCRIPTION - ONSET: ONSET: ON-GOING

## 2018-03-10 ASSESSMENT — PAIN DESCRIPTION - PROGRESSION: CLINICAL_PROGRESSION: RAPIDLY WORSENING

## 2018-03-10 ASSESSMENT — PAIN DESCRIPTION - LOCATION: LOCATION: SHOULDER

## 2018-03-10 ASSESSMENT — PAIN DESCRIPTION - ORIENTATION: ORIENTATION: RIGHT

## 2018-03-10 ASSESSMENT — PAIN DESCRIPTION - DESCRIPTORS: DESCRIPTORS: SHARP

## 2018-03-10 NOTE — PROGRESS NOTES
Physical Therapy  Facility/Department: [facility] MED SURG  Daily Treatment Note  NAME: Lc Rizo  : 1945  MRN: [id number]    Date of Service: 3/10/2018    Patient Diagnosis(es):   Patient Active Problem List    Diagnosis Date Noted    Fracture of humeral head, closed, right, closed, initial encounter 2018    History of carotid artery disease s/p right carotid endarterectomy 2018    Coronary artery disease involving native heart without angina pectoris s/p CABG 2018    Chronic a-fib ([address] 2018    Essential hypertension 2018    Pulmonary nodule 2018    Fracture of humeral head, closed, right, initial encounter 2018    Closed right humeral fracture 2018       Past Medical History:   Diagnosis Date    Arthritis     ED (erectile dysfunction)     Elevated PSA     Hypercholesterolemia     Hypertension     MI (myocardial infarction)     Occult blood in stools 10/19/2015     Past Surgical History:   Procedure Laterality Date    CAROTID ENDARTERECTOMY      bilateral    CHOLECYSTECTOMY      COLONOSCOPY  08    COLONOSCOPY  6 15 15    polyp,bx    CORONARY ARTERY BYPASS GRAFT      3 vessel    ERCP      with stent    HERNIA REPAIR      inguinal    HIP ARTHROSCOPY Left 2000    JOINT REPLACEMENT Right     shoulder    KNEE ARTHROSCOPY Left     LUMBAR LAMINECTOMY      [place] SONO GUIDE NEEDLE BIOPSY  13    PTCA  ,     TOTAL HIP ARTHROPLASTY      TOTAL KNEE ARTHROPLASTY      left       Restrictions  Restrictions/Precautions  Restrictions/Precautions: Fall Risk  Required Braces or Orthoses?: Yes (sling right arm)  Implants present? : Metal implants (right shoulder replacement, left THR, left TKR, sternal wires, cardiac stents)  Required Braces or Orthoses  Right Upper Extremity Brace/Splint: Sling  Position Activity Restriction  Other position/activity restrictions:  Pt is to avoid pushing, pulling or lifting w/ right hand. Remain in sling x 1 week. Subjective   General  Response To Previous Treatment: Not applicable  Family / Caregiver Present: Yes (spouse Marika Esquivel in for education gait, transfers agrees unsafe)  Referring Practitioner: Dr. Connor Fairly: pt fell at gas station and injured right shoulder. Pt appears anxious at times and reports fear of falling. Pt had C/O nausea while seated in chair and dizziness while walking. General Comment  Comments: pt sustained a periprosthetic fracture around right total shoulder. Pt placed in sling. Per Dr. David Boo- continue w/ sling x 1 week, rest and ice as needed. Pt to have functional brace placed as an OP in 1 week. Pt is to avoid pushing, pulling or lifting w/ right hand. Pain Screening  Patient Currently in Pain: Yes  Pain Assessment  Pain Assessment: 0-10  Pain Level: 8  Pain Type: Acute pain  Pain Location: Shoulder  Pain Orientation: Right  Pain Descriptors: Sharp  Pain Frequency: Intermittent  Pain Onset: On-going  Clinical Progression: Rapidly worsening (increases w/ standing and gait)  Effect of Pain on Daily Activities: pt states makes him   Response to Pain Intervention: Drowsy  Vital Signs  Patient Currently in Pain: Yes       Orientation     Objective   Bed mobility  Bridging: Independent  Scooting: Supervision (with verbal cues for his safety)  Transfers  Sit to Stand:  Moderate Assistance (verbal cues to push up from chair w/ left hand, has difficulty standing from low chair)  Stand to sit: Minimal Assistance (verbal cues to reach back for bed)  Stand Pivot Transfers: Minimal Assistance (used hemicane)  Ambulation  Ambulation?: Yes  Ambulation 1  Surface: level tile  Device: Single point cane  Other Apparatus:  (sling right arm)  Assistance: Minimal assistance  Quality of Gait: wide BECCA, uses short shuffling steps, difficulty sequencing steps w/ s cane  in the left hand (used to using cane in the right hand)  Distance: 10' x 1  Comments: patient is not safe with assisted device very anxious and holds his breath not able to correct despite v/c for safety  Ambulation 2  Surface - 2: level tile  Device 2: Hemiwalker (left hemicane)  Assistance 2: Minimal assistance  Quality of Gait 2: difficulty sequencing steps w/ hemicane in the left hand  Distance: 7'  Gait 3:  Patient held on to wife's arm/ shoulder for support during ambulation of 40 ft to demonstrate the way   They the two of them ambulate at home short distances. He was able to tolerate and perform this today however still had a LOB and was in a hurry to sit down. She controlled the situation as best as she could slowing his pace and telling him to not hurry. She agreed that he was unsafe and required more therapy to learn how to use an AD and would benefit from the exercise program.. wanted out patient rather than home wants brace on first.  Also educated on the acronym: RICE- REST ICE COMPRESSION AND ELEVATION. Comments: further distance deferred due to C/O dizziness  Stairs/Curb  Stairs?: No (deferred patient is not safe to ambulate would not attempt )       Balance  Sitting - Static: Good  Sitting - Dynamic: Good  Standing - Static: Fair (used s cane)  Standing - Dynamic: Fair;- (used s cane)      AROM RLE (degrees)  RLE AROM: WFL  AROM LLE (degrees)  LLE AROM : WFL  AROM RUE (degrees)  RUE General AROM: see OT for UE assessment- sling right arm  AROM LUE (degrees)  LUE General AROM: see OT for UE assessment  Strength RLE  Strength RLE: WFL  Comment: 4/5  Strength LLE  Strength LLE: WFL  Comment: 4/5  Strength RUE  Comment: see OT for UE assessment- sling right arm  Strength LUE  Comment: see OT for UE assessment                 Assessment   Body structures, Functions, Activity limitations: Decreased functional mobility ; Decreased balance  Assessment: pt would benefit from additional 1 night stay to further  teach patient and spouse for safe transfers and gait on level surfaces and steps. Unable to address 1 step w/ pt today due to C/O dizziness  Treatment Diagnosis: impaired mobility S/P fall/ right periprosthetic fracture right humerus  Prognosis: Good  Patient Education: POC  REQUIRES PT FOLLOW UP: Yes  Activity Tolerance  Activity Tolerance: Patient limited by fatigue;Patient limited by pain; Patient limited by endurance; Patient limited by cognitive status;Treatment limited secondary to agitation  Other Comments  Comments: wife agrees patient always in a hurry understands patient is unsafe states she will be with him at home for supervision and assist (explained concerns to discharge nurse regarding safety)       Discharge Recommendations:  Home with assist PRN, 24 hour supervision or assist, Home with Home health PT (pt would benefit from Home PT wife stated rather do out patient PT in [address] [place] [address],[address]  to further  teach patient and spouse for safe transfers and gait on level surfaces and steps.   Unable to address 1 step w/ pt today safety concerns)    G-Code     OutComes Score                                                    AM-PAC Score             Goals  Short term goals  Time Frame for Short term goals: 1-2 days  Short term goal 1: pt to roll w/ MOD I  to the left using rail  Short term goal 2: pt to demonstrate transfers supine to sit & reverse  w/ min x 1  Short term goal 3: pt to demonstrate transfers sit to stand and bed to W/C using s cane w/ CGA x 1  Short term goal 4: pt to ambulate w/ s cane 30-50' w/ CGA x 1 w/ fair + or better balance  Short term goal 5: pt to ascend/ descend 1 step using post or rail w/ min x 1 to enter the home  Patient Goals   Patient goals : return home w/ spouse    Plan    Plan  Times per week: BID x 1-2 days  Times per day: Twice a day  Specific instructions for Next Treatment: 3-9-18  gait w/ s cane 10' w/ min x 1- attempted left hemicane 7' w/ min x 1- pt having difficulty squencing w/ s cane as he is used to holding it in his right hand, FALL

## 2018-03-10 NOTE — PROGRESS NOTES
Age of Onset    Coronary Art Dis Father     Hypertension Father     Coronary Art Dis Mother     Hypertension Mother     Coronary Art Dis Brother     Hypertension Brother     Coronary Art Dis Sister     Hypertension Sister        Social History     Social History    Marital status:      Spouse name: N/A    Number of children: N/A    Years of education: N/A     Occupational History    Not on file. Social History Main Topics    Smoking status: Former Smoker     Packs/day: 1.00     Quit date: 1983    Smokeless tobacco: Never Used    Alcohol use No      Comment: Previously 4 drinks per month, no alcohol since 12/10/1982    Drug use: No    Sexual activity: Not on file     Other Topics Concern    Not on file     Social History Narrative    No narrative on file       I/O (24Hr): Intake/Output Summary (Last 24 hours) at 03/10/18 1155  Last data filed at 03/10/18 4993   Gross per 24 hour   Intake               10 ml   Output              150 ml   Net             -140 ml       Labs:  No results found for this or any previous visit (from the past 24 hour(s)). Physical Examination:        Vitals:  BP (!) 144/55   Pulse 61   Temp 97.3 °F (36.3 °C) (Oral)   Resp 16   Ht 5' 11\" (1.803 m)   Wt 254 lb 3.1 oz (115.3 kg)   SpO2 97%   BMI 35.45 kg/m²   Temp (24hrs), Av.4 °F (36.3 °C), Min:97.3 °F (36.3 °C), Max:97.5 °F (36.4 °C)    No results for input(s): POCGLU in the last 72 hours.     General appearance - alert, well appearing, and in no acute distress  Mental status - oriented to person, place, and time with normal affect  Head - normocephalic and atraumatic  Eyes - pupils equal and reactive, extraocular eye movements intact, conjunctiva clear  Ears - hearing appears to be intact  Nose - no drainage noted  Mouth - mucous membranes moist  Neck - supple, no carotid bruits, thyroid not palpable  Chest - clear to auscultation, normal effort  Heart - normal rate, Irregular rhythm with

## 2018-03-13 ENCOUNTER — OFFICE VISIT (OUTPATIENT)
Dept: ORTHOPEDIC SURGERY | Age: 73
End: 2018-03-13

## 2018-03-13 DIAGNOSIS — S42.334D CLOSED NONDISPLACED OBLIQUE FRACTURE OF SHAFT OF RIGHT HUMERUS WITH ROUTINE HEALING, SUBSEQUENT ENCOUNTER: Primary | ICD-10-CM

## 2018-03-13 PROCEDURE — 99024 POSTOP FOLLOW-UP VISIT: CPT | Performed by: ORTHOPAEDIC SURGERY

## 2018-03-13 NOTE — PROGRESS NOTES
Date Patient Discharged:03/10/18      Today's Date:03/13/18  :LD  3 Times no answer    Spoke with:      Do you understand the purpose of your medications?:      Do you understand the side effects of your new medications?:      Would you like to speak with a pharmacist about any of your medications or the side effects?:      Were you able to get your prescriptions filled?:      Did you understand your discharge instructions and what you are responsible for in managing your health after discharge?:      While you were here, was your call light answered promptly?:      Was the area around your room kept quiet at night?:      Were your room and bathroom kept clean?:

## 2018-03-13 NOTE — PROGRESS NOTES
HPI: Mr. Augie Sykes presents today for reevaluation of his right periprosthetic humerus fracture. He was seen on consult while in the hospital a few days ago for this injury which stemming from a fall. He has been in the sling since then. He indicates that a has persistent pain at this time in the arm and has developed some bruising since he was last seen. He denies having any numbness or tingling. Physical examination:  Evaluation of patient's right upper extremity demonstrates a moderate amount of ecchymosis involving the anterior aspect upper arm. Sensation remains intact light touch in all dermatomes and he has a 2+ radial pulse with brisk capillary refill in his fingers. He is tender to palpation at the mid humeral shaft and proximal.  He has good range of motion through the elbow. Imaging studies: 2 views of the patient's right shoulder completed on 3/13/18 and compared to his previous x-rays demonstrate maintained alignment of the humerus without any interval displacement of his fracture. His implant appears to be well fixed. It's actually hemiarthroplasty as opposed to total shoulder arthroplasty. Impression and plan: Mr. Augie Sykes is a 24-year-old male with a nondisplaced periprosthetic right humerus fracture. As noted above he is humeral stem appears to be well fixed. Consequently at this time within the continue on with conservative management. He will be set up for functional brace. He was instructed to begin pendulum exercises through the shoulder and he may begin range of motion through the elbow passively and advance to active range of motion as pain allows. He is to avoid any lifting, pushing, or pulling with this extremity. I'll see him back my clinic in a week for reevaluation with x-rays but he was encouraged to return or call earlier with any questions and/or concerns.

## 2018-03-19 ENCOUNTER — HOSPITAL ENCOUNTER (OUTPATIENT)
Dept: MRI IMAGING | Age: 73
Discharge: HOME | End: 2018-03-21
Payer: MEDICARE

## 2018-03-19 DIAGNOSIS — G93.40 ENCEPHALOPATHY: ICD-10-CM

## 2018-03-19 DIAGNOSIS — I69.30 MULTI INFARCT STATE: ICD-10-CM

## 2018-03-19 LAB
BUN BLDV-MCNC: 29 MG/DL (ref 8–23)
CREAT SERPL-MCNC: 1.14 MG/DL (ref 0.7–1.2)
GFR AFRICAN AMERICAN: >60 ML/MIN
GFR NON-AFRICAN AMERICAN: >60 ML/MIN
GFR SERPL CREATININE-BSD FRML MDRD: ABNORMAL ML/MIN/{1.73_M2}
GFR SERPL CREATININE-BSD FRML MDRD: ABNORMAL ML/MIN/{1.73_M2}

## 2018-03-19 PROCEDURE — 84520 ASSAY OF UREA NITROGEN: CPT

## 2018-03-19 PROCEDURE — 70553 MRI BRAIN STEM W/O & W/DYE: CPT

## 2018-03-19 PROCEDURE — 2580000003 HC RX 258: Performed by: PSYCHIATRY & NEUROLOGY

## 2018-03-19 PROCEDURE — 6360000004 HC RX CONTRAST MEDICATION: Performed by: PSYCHIATRY & NEUROLOGY

## 2018-03-19 PROCEDURE — 36415 COLL VENOUS BLD VENIPUNCTURE: CPT

## 2018-03-19 PROCEDURE — 82565 ASSAY OF CREATININE: CPT

## 2018-03-19 PROCEDURE — A9579 GAD-BASE MR CONTRAST NOS,1ML: HCPCS | Performed by: PSYCHIATRY & NEUROLOGY

## 2018-03-19 RX ORDER — SODIUM CHLORIDE 0.9 % (FLUSH) 0.9 %
10 SYRINGE (ML) INJECTION PRN
Status: DISCONTINUED | OUTPATIENT
Start: 2018-03-19 | End: 2018-03-22 | Stop reason: HOSPADM

## 2018-03-19 RX ADMIN — GADOTERIDOL 20 ML: 279.3 INJECTION, SOLUTION INTRAVENOUS at 14:58

## 2018-03-19 RX ADMIN — Medication 10 ML: at 14:58

## 2018-03-26 ENCOUNTER — OFFICE VISIT (OUTPATIENT)
Dept: ORTHOPEDIC SURGERY | Age: 73
End: 2018-03-26

## 2018-03-26 DIAGNOSIS — S42.201D CLOSED FRACTURE OF PROXIMAL END OF RIGHT HUMERUS WITH ROUTINE HEALING, UNSPECIFIED FRACTURE MORPHOLOGY, SUBSEQUENT ENCOUNTER: Primary | ICD-10-CM

## 2018-03-26 DIAGNOSIS — Z87.898 HISTORY OF UNSTEADY GAIT: ICD-10-CM

## 2018-03-26 PROCEDURE — 99024 POSTOP FOLLOW-UP VISIT: CPT | Performed by: ORTHOPAEDIC SURGERY

## 2018-04-24 ENCOUNTER — OFFICE VISIT (OUTPATIENT)
Dept: ORTHOPEDIC SURGERY | Age: 73
End: 2018-04-24

## 2018-04-24 DIAGNOSIS — S42.334D CLOSED NONDISPLACED OBLIQUE FRACTURE OF SHAFT OF RIGHT HUMERUS WITH ROUTINE HEALING, SUBSEQUENT ENCOUNTER: Primary | ICD-10-CM

## 2018-04-24 PROCEDURE — 99024 POSTOP FOLLOW-UP VISIT: CPT | Performed by: ORTHOPAEDIC SURGERY

## 2018-06-05 ENCOUNTER — OFFICE VISIT (OUTPATIENT)
Dept: ORTHOPEDIC SURGERY | Age: 73
End: 2018-06-05

## 2018-06-05 DIAGNOSIS — S42.201D CLOSED FRACTURE OF PROXIMAL END OF RIGHT HUMERUS WITH ROUTINE HEALING, UNSPECIFIED FRACTURE MORPHOLOGY, SUBSEQUENT ENCOUNTER: Primary | ICD-10-CM

## 2018-06-05 DIAGNOSIS — S42.291A FRACTURE OF HUMERAL HEAD, CLOSED, RIGHT, INITIAL ENCOUNTER: ICD-10-CM

## 2018-06-05 PROCEDURE — 99024 POSTOP FOLLOW-UP VISIT: CPT | Performed by: ORTHOPAEDIC SURGERY

## 2018-08-07 ENCOUNTER — OFFICE VISIT (OUTPATIENT)
Dept: ORTHOPEDIC SURGERY | Age: 73
End: 2018-08-07
Payer: MEDICARE

## 2018-08-07 DIAGNOSIS — S42.201D CLOSED FRACTURE OF PROXIMAL END OF RIGHT HUMERUS WITH ROUTINE HEALING, UNSPECIFIED FRACTURE MORPHOLOGY, SUBSEQUENT ENCOUNTER: Primary | ICD-10-CM

## 2018-08-07 PROCEDURE — 99212 OFFICE O/P EST SF 10 MIN: CPT | Performed by: ORTHOPAEDIC SURGERY

## 2019-04-01 ENCOUNTER — APPOINTMENT (OUTPATIENT)
Dept: GENERAL RADIOLOGY | Age: 74
End: 2019-04-01
Payer: MEDICARE

## 2019-04-01 ENCOUNTER — HOSPITAL ENCOUNTER (EMERGENCY)
Age: 74
Discharge: HOME | End: 2019-04-01
Attending: EMERGENCY MEDICINE
Payer: MEDICARE

## 2019-04-01 VITALS
BODY MASS INDEX: 31.83 KG/M2 | OXYGEN SATURATION: 98 % | TEMPERATURE: 97.5 F | RESPIRATION RATE: 16 BRPM | HEART RATE: 92 BPM | DIASTOLIC BLOOD PRESSURE: 80 MMHG | SYSTOLIC BLOOD PRESSURE: 150 MMHG | WEIGHT: 235 LBS | HEIGHT: 72 IN

## 2019-04-01 DIAGNOSIS — S69.92XA INJURY OF LEFT WRIST, INITIAL ENCOUNTER: Primary | ICD-10-CM

## 2019-04-01 PROCEDURE — 29125 APPL SHORT ARM SPLINT STATIC: CPT

## 2019-04-01 PROCEDURE — 6370000000 HC RX 637 (ALT 250 FOR IP): Performed by: NURSE PRACTITIONER

## 2019-04-01 PROCEDURE — 73110 X-RAY EXAM OF WRIST: CPT

## 2019-04-01 PROCEDURE — 99283 EMERGENCY DEPT VISIT LOW MDM: CPT

## 2019-04-01 PROCEDURE — 73130 X-RAY EXAM OF HAND: CPT

## 2019-04-01 RX ORDER — HYDROCODONE BITARTRATE AND ACETAMINOPHEN 5; 325 MG/1; MG/1
1 TABLET ORAL ONCE
Status: COMPLETED | OUTPATIENT
Start: 2019-04-01 | End: 2019-04-01

## 2019-04-01 RX ORDER — TRAMADOL HYDROCHLORIDE 50 MG/1
50 TABLET ORAL EVERY 6 HOURS PRN
Qty: 10 TABLET | Refills: 0 | Status: SHIPPED | OUTPATIENT
Start: 2019-04-01 | End: 2019-04-04

## 2019-04-01 RX ADMIN — HYDROCODONE BITARTRATE AND ACETAMINOPHEN 1 TABLET: 5; 325 TABLET ORAL at 13:54

## 2019-04-01 ASSESSMENT — PAIN SCALES - GENERAL
PAINLEVEL_OUTOF10: 7
PAINLEVEL_OUTOF10: 7
PAINLEVEL_OUTOF10: 5

## 2019-04-01 ASSESSMENT — PAIN DESCRIPTION - LOCATION
LOCATION: WRIST
LOCATION: WRIST

## 2019-04-01 ASSESSMENT — PAIN DESCRIPTION - DESCRIPTORS: DESCRIPTORS: ACHING

## 2019-04-01 ASSESSMENT — ENCOUNTER SYMPTOMS
TROUBLE SWALLOWING: 0
SHORTNESS OF BREATH: 0
NAUSEA: 0
COUGH: 0
VOMITING: 0

## 2019-04-01 ASSESSMENT — PAIN DESCRIPTION - FREQUENCY: FREQUENCY: CONTINUOUS

## 2019-04-01 ASSESSMENT — PAIN DESCRIPTION - ORIENTATION
ORIENTATION: LEFT
ORIENTATION: LEFT

## 2019-04-01 ASSESSMENT — PAIN DESCRIPTION - PAIN TYPE: TYPE: ACUTE PAIN

## 2019-04-01 ASSESSMENT — PAIN DESCRIPTION - ONSET: ONSET: ON-GOING

## 2019-04-01 NOTE — ED PROVIDER NOTES
[address] ED  eMERGENCY dEPARTMENT eNCOUnter   Independent Attestation     Pt Name: Farrah Ash  MRN: [id number]  [place] 1945  Date of evaluation: 4/1/19     Farrah Ash is a 74 y.o. male with CC: Wrist Injury      Based on the medical record the care appears appropriate. I was personally available for consultation in the Emergency Department.     Alison Butt MD  Attending Emergency Physician                    Alison Butt MD  04/01/19 8963

## 2019-04-01 NOTE — ED PROVIDER NOTES
[address] ED  eMERGENCYdEPARTMENT eNCOUnter      Pt Name: Bryan Iverson  MRN: [id number]  [place] 1945  Date of evaluation: 4/1/2019  Provider:ELKIN HOWELL CNP    CHIEF COMPLAINT       Chief Complaint   Patient presents with    Wrist Injury         HISTORY OF PRESENT ILLNESS  (Location/Symptom, Timing/Onset, Context/Setting, Quality, Duration, Modifying Factors, Severity.)   Bryan Iverson is a 74 y.o. male who presents to the emergency department with a friend for evaluation of left wrist injury. Patient relates that he lost his balance and fell. He landed on his left side and injured his left wrist.  Platelets of pain, swelling and bruising to left wrist.  States that pain is \"in the joint\". Pain is worsened by flexion and extension of the wrist.  He is right-hand dominant. Patient denies hitting his head or loss of consciousness. Denies neck or back pain. Denies being on blood thinners. Denies numbness, tingling or weakness. Denies chest pain, shortness of breath, abdominal pain, nausea or vomiting. Nursing Notes were reviewed and I agree. REVIEW OF SYSTEMS    (2-9 systems for level 4,10 or more for level 5)      Review of Systems   Constitutional: Negative for chills and fever. HENT: Negative for trouble swallowing. Respiratory: Negative for cough and shortness of breath. Cardiovascular: Negative for chest pain and palpitations. Gastrointestinal: Negative for nausea and vomiting. Musculoskeletal:        Fall with left wrist injury     Except as noted above the remainder of the review of systems was reviewed andnegative. PAST MEDICAL HISTORY         Diagnosis Date    Arthritis     ED (erectile dysfunction)     Elevated PSA     Hypercholesterolemia     Hypertension     MI ([facility] infarction) [address]     Occult blood in stools 10/19/2015     Reviewed.   SURGICAL HISTORY           Procedure Laterality Date   [address] [place] [address] bilateral    CHOLECYSTECTOMY  2007    COLONOSCOPY  2-25-08    COLONOSCOPY  6 15 15    polyp,bx    CORONARY ARTERY BYPASS GRAFT  1992    3 vessel    ERCP      with stent    HERNIA REPAIR  1988    inguinal    HIP ARTHROSCOPY Left 2000    JOINT REPLACEMENT Right 1996    shoulder    KNEE ARTHROSCOPY Left 1981    LUMBAR LAMINECTOMY  1990    [place] SONO GUIDE NEEDLE BIOPSY  1/8/13    PTCA  2004, 1984    TOTAL HIP ARTHROPLASTY  1990    TOTAL KNEE ARTHROPLASTY  1999    left     Reviewed. CURRENT MEDICATIONS       Discharge Medication List as of 4/1/2019  2:18 PM      CONTINUE these medications which have NOT CHANGED    Details   metoprolol (LOPRESSOR) 50 MG tablet Take 50 mg by mouth 2 times daily. apixaban (ELIQUIS) 5 MG TABS tablet Take by mouth 2 times daily. acetaminophen (TYLENOL ARTHRITIS PAIN) 650 MG CR tablet Take 1,300 mg by mouth 2 times daily       Melatonin 5 MG TABS tablet Take 1 tablet by mouth daily as needed. calcium carbonate (TUMS) 500 MG chewable tablet Take 1 tablet by mouth 2 times daily as needed for Heartburn. tamsulosin (FLOMAX) 0.4 MG capsule Take 1 capsule by mouth daily. , Disp-30 capsule, R-11      aspirin 81 MG tablet Take 162 mg by mouth once Historical Med      amLODIPine-atorvastatatin (CADUET) 10-20 MG per tablet Take 1 tablet by mouth daily. Historical Med      losartan-hydrochlorothiazide (HYZAAR) 100-12.5 MG per tablet Take 1 tablet by mouth daily. MULTIPLE VITAMINS PO Take 1 tablet by mouth daily.              ALLERGIES     Morphine    FAMILY HISTORY           Problem Relation Age of Onset    Coronary Art Dis Father     Hypertension Father     Coronary Art Dis Mother     Hypertension Mother     Coronary Art Dis Brother     Hypertension Brother     Coronary Art Dis Sister     Hypertension Sister      Family Status   Relation Name Status    Father  (Not Specified)    Mother  (Not Specified)    Brother  (Not Specified)    Sister  (Not Specified) clinic of choice in 1 or 2 days for a recheck. Return to ED if any worsening or new symptoms. Vitals:    Vitals:    04/01/19 1317 04/01/19 1430   BP: 139/86 (!) 150/80   Pulse: 90 92   Resp: 16 16   Temp: 97.5 °F (36.4 °C)    SpO2: 98% 98%   Weight: 235 lb (106.6 kg)    Height: 6' (1.829 m)          Vitals reviewed. PROCEDURES:  Splint Application  Date/Time: 4/1/2019 2:04 PM  Performed by: ELKIN Long - CNP  Authorized by: Anaya Spicer MD     Consent:     Consent obtained:  Verbal    Consent given by:  Patient    Risks discussed:  Discoloration, numbness, pain and swelling    Alternatives discussed:  No treatment  Pre-procedure details:     Sensation:  Normal  Procedure details:     Laterality:  Left    Location:  Wrist    Splint type:  Volar short arm    Supplies:  Elastic bandage and Ortho-Glass  Post-procedure details:     Pain:  Unchanged    Sensation:  Normal    Patient tolerance of procedure: Tolerated well, no immediate complications  Comments:      Splint applied by RN. Neurovascular intact. Splint care instructions explained to patient/ family. FINAL IMPRESSION      1. Injury of left wrist, initial encounter          DISPOSITION/PLAN   DISPOSITION Decision To Discharge 04/01/2019 02:02:31 PM      PATIENT REFERRED TO:  Duran Reyes MD  [address] [place] [address] [address] [phone]    Schedule an appointment as soon as possible for a visit in 1 day  Follow up visit    [address] [address]  [phone]    If symptoms worsen      DISCHARGE MEDICATIONS:  Discharge Medication List as of 4/1/2019  2:18 PM      START taking these medications    Details   traMADol (ULTRAM) 50 MG tablet Take 1 tablet by mouth every 6 hours as needed for Pain for up to 3 days. , Disp-10 tablet, R-0Print             (Please note thatportions of this note were completed with a voice recognition program.  Efforts were made to edit the dictations but occasionally words are mis-transcribed.)    Ewa Leung, ELKIN - ELKIN Coyle - CNP  04/01/19 1532

## 2019-04-30 ENCOUNTER — OFFICE VISIT (OUTPATIENT)
Dept: ORTHOPEDIC SURGERY | Age: 74
End: 2019-04-30
Payer: MEDICARE

## 2019-04-30 DIAGNOSIS — M25.532 LEFT WRIST PAIN: Primary | ICD-10-CM

## 2019-04-30 PROCEDURE — 99213 OFFICE O/P EST LOW 20 MIN: CPT | Performed by: ORTHOPAEDIC SURGERY

## 2019-04-30 NOTE — LETTER
4/30/2019    Curry Haq MD  [address] [place] [address],[address]  [place], [address] [place] [address]    RE: Charli Haynes    Dear Dr. Guevara Krueger,    Thank you for allowing me to participate in the care of Mr. Roberth Zepeda. I had the opportunity to evaluate the patient on 4/30/2019. Attached you will find my evaluation and recommendations. Thanks again for the confidence you have expressed in me by allowing my participation in the care of your patient. I will keep you apprised of further developments in the patients treatment course as it progresses. If I can be of further assistance in any fashion, please feel free to contact me at your convenience.     Sincerely,        Ce Baez  Shoulder and Elbow Surgery

## 2019-05-13 NOTE — PROGRESS NOTES
ORTHOPEDIC PATIENT EVALUATION      HPI / Chief Complaint  Yvonne Garcia is a 74 y.o. male who presents for evaluation of his left wrist.  About 3 weeks ago he states that he fell and tried to break his fall. He rolled on his left wrist and had pain initially. He went in [facility] department on 4/1/19 at which time he was x-rays and placed in a splint. Presents today for further evaluation and treatment. He states that he is no longer painful and he is symptom-free. Past Medical History  Henny Cosme  has a past medical history of Arthritis, ED (erectile dysfunction), Elevated PSA, Hypercholesterolemia, Hypertension, MI (myocardial infarction) ([address] and Occult blood in stools. Past Surgical History  Henyn Cosme  has a past surgical history that includes pr sono guide needle biopsy (1/8/13); Knee arthroscopy (Left, 1981); Carotid endarterectomy (1998); Coronary artery bypass graft (1992); Hip arthroscopy (Left, 2000); Total hip arthroplasty (1990); hernia repair (1988); lumbar laminectomy (1990); Percutaneous Transluminal Coronary Angio (2004, 1984); Total knee arthroplasty (1999); joint replacement (Right, 1996); ERCP; Colonoscopy (2-25-08); Cholecystectomy (2007); and Colonoscopy (6 15 15). Current Medications  Current Outpatient Medications   Medication Sig Dispense Refill    metoprolol (LOPRESSOR) 50 MG tablet Take 50 mg by mouth 2 times daily.  apixaban (ELIQUIS) 5 MG TABS tablet Take by mouth 2 times daily.  acetaminophen (TYLENOL ARTHRITIS PAIN) 650 MG CR tablet Take 1,300 mg by mouth 2 times daily       Melatonin 5 MG TABS tablet Take 1 tablet by mouth daily as needed.  calcium carbonate (TUMS) 500 MG chewable tablet Take 1 tablet by mouth 2 times daily as needed for Heartburn.  tamsulosin (FLOMAX) 0.4 MG capsule Take 1 capsule by mouth daily.  30 capsule 11    aspirin 81 MG tablet Take 162 mg by mouth once       amLODIPine-atorvastatatin (CADUET) 10-20 MG per tablet Take 1 tablet by mouth daily.  losartan-hydrochlorothiazide (HYZAAR) 100-12.5 MG per tablet Take 1 tablet by mouth daily.  MULTIPLE VITAMINS PO Take 1 tablet by mouth daily. No current facility-administered medications for this visit. Allergies  Allergies have been reviewed. Senthil Liriano is allergic to morphine. Social History  Senthil Liriano  reports that he quit smoking about 35 years ago. He smoked 1.00 pack per day. He has never used smokeless tobacco. He reports that he does not drink alcohol or use drugs. Family History  Mike's family history includes Coronary Art Dis in his brother, father, mother, and sister; Hypertension in his brother, father, mother, and sister. Review of Systems   History obtained from the patient. REVIEW OF SYSTEMS:   Constitution: negative for fever, chills, weight loss or malaise   Musculoskeletal: As noted in the HPI   Neurologic: As noted in the HPI    Physical Exam  There were no vitals taken for this visit. General Appearance: alert, well appearing, and in no distress  Mental Status: alert, oriented to person, place, and time  Evaluation of patient's left wrist and upper extremity demonstrates intact skin without any warmth, erythema, or notable swelling. Sensation is grossly intact light touch in all dermatomes. He has a 2+ radial pulse and brisk capillary refill in his fingers. He is nontender to palpation diffusely about the wrist or hand. He has good range of motion with flexion and extension through the wrist.    Imaging Studies  Xrays of the left wrist and hand obtained on 4/1/19 were independently reviewed demonstrating no obvious fracture, dislocation, or subluxation. He does have complete joint space loss with sclerosis and osteophytic change involving the first [address] joint. Assessment and Plan  Yohana Arroyo is a 74 y.o. old male likely with a left wrist sprain that has gotten better at this time.   He also has severe left first CMC osteoarthritis but is asymptomatic. I had a discussion with the patient today with regards to all of this. We discussed treatment options. At this time I would recommend continued symptomatic management as needed and I'll have him follow-up in my clinic as needed. He was encouraged to return or call at anytime with questions and/or concerns.

## 2019-11-21 ENCOUNTER — APPOINTMENT (OUTPATIENT)
Dept: NUCLEAR MEDICINE | Age: 74
End: 2019-11-21
Payer: MEDICARE

## 2019-11-21 ENCOUNTER — APPOINTMENT (OUTPATIENT)
Dept: GENERAL RADIOLOGY | Age: 74
End: 2019-11-21
Payer: MEDICARE

## 2019-11-21 ENCOUNTER — HOSPITAL ENCOUNTER (OUTPATIENT)
Age: 74
Setting detail: OBSERVATION
Discharge: HOME | End: 2019-11-22
Attending: EMERGENCY MEDICINE | Admitting: FAMILY MEDICINE
Payer: MEDICARE

## 2019-11-21 DIAGNOSIS — R07.9 CHEST PAIN, UNSPECIFIED TYPE: Primary | ICD-10-CM

## 2019-11-21 PROBLEM — R41.0 CONFUSION AND DISORIENTATION: Status: ACTIVE | Noted: 2017-11-28

## 2019-11-21 PROBLEM — N40.1 BENIGN PROSTATIC HYPERPLASIA WITH URINARY FREQUENCY: Status: ACTIVE | Noted: 2018-07-05

## 2019-11-21 PROBLEM — F41.9 ANXIETY: Status: ACTIVE | Noted: 2018-04-05

## 2019-11-21 PROBLEM — R41.3 MEMORY CHANGES: Status: ACTIVE | Noted: 2017-11-28

## 2019-11-21 PROBLEM — R97.20 ELEVATED PSA: Status: ACTIVE | Noted: 2017-10-02

## 2019-11-21 PROBLEM — I95.1 ORTHOSTATIC HYPOTENSION: Status: ACTIVE | Noted: 2018-02-28

## 2019-11-21 PROBLEM — E66.01 SEVERE OBESITY (BMI 35.0-39.9) WITH COMORBIDITY (HCC): Status: ACTIVE | Noted: 2018-07-02

## 2019-11-21 PROBLEM — R35.0 BENIGN PROSTATIC HYPERPLASIA WITH URINARY FREQUENCY: Status: ACTIVE | Noted: 2018-07-05

## 2019-11-21 LAB
ABSOLUTE EOS #: 0.3 K/UL (ref 0–0.4)
ABSOLUTE IMMATURE GRANULOCYTE: ABNORMAL K/UL (ref 0–0.3)
ABSOLUTE LYMPH #: 2.7 K/UL (ref 1–4.8)
ABSOLUTE MONO #: 0.9 K/UL (ref 0.1–1.3)
ANION GAP SERPL CALCULATED.3IONS-SCNC: 10 MMOL/L (ref 9–17)
BASOPHILS # BLD: 1 % (ref 0–2)
BASOPHILS ABSOLUTE: 0.1 K/UL (ref 0–0.2)
BUN BLDV-MCNC: 31 MG/DL (ref 8–23)
BUN/CREAT BLD: ABNORMAL (ref 9–20)
CALCIUM SERPL-MCNC: 10 MG/DL (ref 8.6–10.4)
CHLORIDE BLD-SCNC: 103 MMOL/L (ref 98–107)
CHOLESTEROL/HDL RATIO: 2.6
CHOLESTEROL: 113 MG/DL
CO2: 27 MMOL/L (ref 20–31)
CREAT SERPL-MCNC: 0.97 MG/DL (ref 0.7–1.2)
DIFFERENTIAL TYPE: ABNORMAL
EOSINOPHILS RELATIVE PERCENT: 3 % (ref 0–4)
ESTIMATED AVERAGE GLUCOSE: 114 MG/DL
GFR AFRICAN AMERICAN: >60 ML/MIN
GFR NON-AFRICAN AMERICAN: >60 ML/MIN
GFR SERPL CREATININE-BSD FRML MDRD: ABNORMAL ML/MIN/{1.73_M2}
GFR SERPL CREATININE-BSD FRML MDRD: ABNORMAL ML/MIN/{1.73_M2}
GLUCOSE BLD-MCNC: 108 MG/DL (ref 70–99)
HBA1C MFR BLD: 5.6 % (ref 4–6)
HCT VFR BLD CALC: 40.3 % (ref 41–53)
HDLC SERPL-MCNC: 43 MG/DL
HEMOGLOBIN: 13.8 G/DL (ref 13.5–17.5)
IMMATURE GRANULOCYTES: ABNORMAL %
INR BLD: 1.3
LDL CHOLESTEROL: 53 MG/DL (ref 0–130)
LV EF: 63 %
LVEF MODALITY: NORMAL
LYMPHOCYTES # BLD: 28 % (ref 24–44)
MCH RBC QN AUTO: 33.2 PG (ref 26–34)
MCHC RBC AUTO-ENTMCNC: 34.4 G/DL (ref 31–37)
MCV RBC AUTO: 96.4 FL (ref 80–100)
MONOCYTES # BLD: 9 % (ref 1–7)
NRBC AUTOMATED: ABNORMAL PER 100 WBC
PARTIAL THROMBOPLASTIN TIME: 53.7 SEC (ref 24–36)
PDW BLD-RTO: 13.3 % (ref 11.5–14.9)
PLATELET # BLD: 260 K/UL (ref 150–450)
PLATELET ESTIMATE: ABNORMAL
PMV BLD AUTO: 8.8 FL (ref 6–12)
POTASSIUM SERPL-SCNC: 4.3 MMOL/L (ref 3.7–5.3)
PROTHROMBIN TIME: 16.4 SEC (ref 11.8–14.6)
RBC # BLD: 4.17 M/UL (ref 4.5–5.9)
RBC # BLD: ABNORMAL 10*6/UL
SEG NEUTROPHILS: 59 % (ref 36–66)
SEGMENTED NEUTROPHILS ABSOLUTE COUNT: 5.8 K/UL (ref 1.3–9.1)
SODIUM BLD-SCNC: 140 MMOL/L (ref 135–144)
TRIGL SERPL-MCNC: 84 MG/DL
TROPONIN INTERP: NORMAL
TROPONIN T: NORMAL NG/ML
TROPONIN, HIGH SENSITIVITY: 20 NG/L (ref 0–22)
TROPONIN, HIGH SENSITIVITY: 21 NG/L (ref 0–22)
TROPONIN, HIGH SENSITIVITY: 21 NG/L (ref 0–22)
VLDLC SERPL CALC-MCNC: NORMAL MG/DL (ref 1–30)
WBC # BLD: 9.8 K/UL (ref 3.5–11)
WBC # BLD: ABNORMAL 10*3/UL

## 2019-11-21 PROCEDURE — C8929 TTE W OR WO FOL WCON,DOPPLER: HCPCS

## 2019-11-21 PROCEDURE — 84484 ASSAY OF TROPONIN QUANT: CPT

## 2019-11-21 PROCEDURE — 83036 HEMOGLOBIN GLYCOSYLATED A1C: CPT

## 2019-11-21 PROCEDURE — 3430000000 HC RX DIAGNOSTIC RADIOPHARMACEUTICAL: Performed by: INTERNAL MEDICINE

## 2019-11-21 PROCEDURE — 97162 PT EVAL MOD COMPLEX 30 MIN: CPT

## 2019-11-21 PROCEDURE — 6370000000 HC RX 637 (ALT 250 FOR IP): Performed by: FAMILY MEDICINE

## 2019-11-21 PROCEDURE — 85025 COMPLETE CBC W/AUTO DIFF WBC: CPT

## 2019-11-21 PROCEDURE — 97166 OT EVAL MOD COMPLEX 45 MIN: CPT

## 2019-11-21 PROCEDURE — 2580000003 HC RX 258: Performed by: INTERNAL MEDICINE

## 2019-11-21 PROCEDURE — 2500000003 HC RX 250 WO HCPCS: Performed by: FAMILY MEDICINE

## 2019-11-21 PROCEDURE — 80048 BASIC METABOLIC PNL TOTAL CA: CPT

## 2019-11-21 PROCEDURE — 85610 PROTHROMBIN TIME: CPT

## 2019-11-21 PROCEDURE — 6360000004 HC RX CONTRAST MEDICATION: Performed by: INTERNAL MEDICINE

## 2019-11-21 PROCEDURE — 97530 THERAPEUTIC ACTIVITIES: CPT

## 2019-11-21 PROCEDURE — 80061 LIPID PANEL: CPT

## 2019-11-21 PROCEDURE — G0378 HOSPITAL OBSERVATION PER HR: HCPCS

## 2019-11-21 PROCEDURE — 99285 EMERGENCY DEPT VISIT HI MDM: CPT

## 2019-11-21 PROCEDURE — 85730 THROMBOPLASTIN TIME PARTIAL: CPT

## 2019-11-21 PROCEDURE — A9500 TC99M SESTAMIBI: HCPCS | Performed by: INTERNAL MEDICINE

## 2019-11-21 PROCEDURE — 71046 X-RAY EXAM CHEST 2 VIEWS: CPT

## 2019-11-21 PROCEDURE — 36415 COLL VENOUS BLD VENIPUNCTURE: CPT

## 2019-11-21 RX ORDER — FENTANYL CITRATE 50 UG/ML
25 INJECTION, SOLUTION INTRAMUSCULAR; INTRAVENOUS ONCE
Status: COMPLETED | OUTPATIENT
Start: 2019-11-21 | End: 2019-11-22

## 2019-11-21 RX ORDER — BUSPIRONE HYDROCHLORIDE 10 MG/1
5 TABLET ORAL 2 TIMES DAILY
COMMUNITY

## 2019-11-21 RX ORDER — NITROGLYCERIN 0.4 MG/1
0.4 TABLET SUBLINGUAL EVERY 5 MIN PRN
Status: DISCONTINUED | OUTPATIENT
Start: 2019-11-21 | End: 2019-11-21

## 2019-11-21 RX ORDER — BUSPIRONE HYDROCHLORIDE 5 MG/1
5 TABLET ORAL 2 TIMES DAILY
Status: DISCONTINUED | OUTPATIENT
Start: 2019-11-21 | End: 2019-11-21

## 2019-11-21 RX ORDER — TRAMADOL HYDROCHLORIDE 50 MG/1
50 TABLET ORAL EVERY 6 HOURS PRN
Status: DISCONTINUED | OUTPATIENT
Start: 2019-11-21 | End: 2019-11-22 | Stop reason: HOSPADM

## 2019-11-21 RX ORDER — ACETAMINOPHEN 325 MG/1
650 TABLET ORAL EVERY 4 HOURS PRN
Status: DISCONTINUED | OUTPATIENT
Start: 2019-11-21 | End: 2019-11-22 | Stop reason: HOSPADM

## 2019-11-21 RX ORDER — SODIUM CHLORIDE 0.9 % (FLUSH) 0.9 %
10 SYRINGE (ML) INJECTION PRN
Status: DISCONTINUED | OUTPATIENT
Start: 2019-11-21 | End: 2019-11-22

## 2019-11-21 RX ORDER — ACETAMINOPHEN 325 MG/1
650 TABLET ORAL EVERY 4 HOURS PRN
Status: DISCONTINUED | OUTPATIENT
Start: 2019-11-21 | End: 2019-11-22

## 2019-11-21 RX ORDER — LOSARTAN POTASSIUM AND HYDROCHLOROTHIAZIDE 12.5; 1 MG/1; MG/1
1 TABLET ORAL DAILY
Status: DISCONTINUED | OUTPATIENT
Start: 2019-11-21 | End: 2019-11-21

## 2019-11-21 RX ORDER — TAMSULOSIN HYDROCHLORIDE 0.4 MG/1
0.4 CAPSULE ORAL DAILY
Status: DISCONTINUED | OUTPATIENT
Start: 2019-11-21 | End: 2019-11-21

## 2019-11-21 RX ORDER — ATROPINE SULFATE 0.1 MG/ML
0.5 INJECTION INTRAVENOUS EVERY 5 MIN PRN
Status: DISCONTINUED | OUTPATIENT
Start: 2019-11-21 | End: 2019-11-21

## 2019-11-21 RX ORDER — AMLODIPINE BESYLATE 5 MG/1
5 TABLET ORAL DAILY
COMMUNITY

## 2019-11-21 RX ORDER — CHOLECALCIFEROL (VITAMIN D3) 125 MCG
1 CAPSULE ORAL DAILY PRN
Status: DISCONTINUED | OUTPATIENT
Start: 2019-11-21 | End: 2019-11-21

## 2019-11-21 RX ORDER — SODIUM CHLORIDE 0.9 % (FLUSH) 0.9 %
10 SYRINGE (ML) INJECTION PRN
Status: DISCONTINUED | OUTPATIENT
Start: 2019-11-21 | End: 2019-11-21

## 2019-11-21 RX ORDER — ATORVASTATIN CALCIUM 20 MG/1
20 TABLET, FILM COATED ORAL DAILY
Status: DISCONTINUED | OUTPATIENT
Start: 2019-11-21 | End: 2019-11-22 | Stop reason: HOSPADM

## 2019-11-21 RX ORDER — TRAMADOL HYDROCHLORIDE 50 MG/1
50 TABLET ORAL EVERY 6 HOURS PRN
Status: ON HOLD | COMMUNITY
End: 2020-02-28 | Stop reason: HOSPADM

## 2019-11-21 RX ORDER — FAMOTIDINE 20 MG/1
20 TABLET, FILM COATED ORAL 2 TIMES DAILY
Status: DISCONTINUED | OUTPATIENT
Start: 2019-11-21 | End: 2019-11-22 | Stop reason: HOSPADM

## 2019-11-21 RX ORDER — ATORVASTATIN CALCIUM 20 MG/1
20 TABLET, FILM COATED ORAL DAILY
COMMUNITY

## 2019-11-21 RX ORDER — SODIUM CHLORIDE 0.9 % (FLUSH) 0.9 %
10 SYRINGE (ML) INJECTION EVERY 12 HOURS SCHEDULED
Status: DISCONTINUED | OUTPATIENT
Start: 2019-11-21 | End: 2019-11-22

## 2019-11-21 RX ORDER — MAGNESIUM SULFATE 1 G/100ML
1 INJECTION INTRAVENOUS PRN
Status: DISCONTINUED | OUTPATIENT
Start: 2019-11-21 | End: 2019-11-22 | Stop reason: HOSPADM

## 2019-11-21 RX ORDER — SODIUM CHLORIDE 0.9 % (FLUSH) 0.9 %
10 SYRINGE (ML) INJECTION PRN
Status: DISCONTINUED | OUTPATIENT
Start: 2019-11-21 | End: 2019-11-22 | Stop reason: HOSPADM

## 2019-11-21 RX ORDER — DOCUSATE SODIUM 100 MG/1
100 CAPSULE, LIQUID FILLED ORAL PRN
Status: DISCONTINUED | OUTPATIENT
Start: 2019-11-21 | End: 2019-11-22 | Stop reason: HOSPADM

## 2019-11-21 RX ORDER — CALCIUM CARBONATE 200(500)MG
1 TABLET,CHEWABLE ORAL 2 TIMES DAILY PRN
Status: DISCONTINUED | OUTPATIENT
Start: 2019-11-21 | End: 2019-11-22 | Stop reason: HOSPADM

## 2019-11-21 RX ORDER — ONDANSETRON 2 MG/ML
4 INJECTION INTRAMUSCULAR; INTRAVENOUS EVERY 6 HOURS PRN
Status: DISCONTINUED | OUTPATIENT
Start: 2019-11-21 | End: 2019-11-22 | Stop reason: HOSPADM

## 2019-11-21 RX ORDER — DOCUSATE SODIUM 100 MG/1
100 CAPSULE, LIQUID FILLED ORAL PRN
COMMUNITY

## 2019-11-21 RX ORDER — SODIUM CHLORIDE 9 MG/ML
500 INJECTION, SOLUTION INTRAVENOUS CONTINUOUS PRN
Status: DISCONTINUED | OUTPATIENT
Start: 2019-11-21 | End: 2019-11-21

## 2019-11-21 RX ORDER — SODIUM CHLORIDE 0.9 % (FLUSH) 0.9 %
10 SYRINGE (ML) INJECTION EVERY 12 HOURS SCHEDULED
Status: DISCONTINUED | OUTPATIENT
Start: 2019-11-21 | End: 2019-11-22 | Stop reason: HOSPADM

## 2019-11-21 RX ORDER — ALBUTEROL SULFATE 90 UG/1
2 AEROSOL, METERED RESPIRATORY (INHALATION) PRN
Status: DISCONTINUED | OUTPATIENT
Start: 2019-11-21 | End: 2019-11-21

## 2019-11-21 RX ORDER — POLYETHYLENE GLYCOL 3350 17 G/17G
17 POWDER, FOR SOLUTION ORAL DAILY PRN
Status: DISCONTINUED | OUTPATIENT
Start: 2019-11-21 | End: 2019-11-22 | Stop reason: HOSPADM

## 2019-11-21 RX ORDER — HYDROCHLOROTHIAZIDE 12.5 MG/1
12.5 CAPSULE, GELATIN COATED ORAL DAILY
Status: DISCONTINUED | OUTPATIENT
Start: 2019-11-21 | End: 2019-11-21

## 2019-11-21 RX ORDER — BUSPIRONE HYDROCHLORIDE 15 MG/1
5 TABLET ORAL 2 TIMES DAILY
Status: DISCONTINUED | OUTPATIENT
Start: 2019-11-21 | End: 2019-11-22 | Stop reason: HOSPADM

## 2019-11-21 RX ORDER — ATORVASTATIN CALCIUM 20 MG/1
20 TABLET, FILM COATED ORAL DAILY
Status: DISCONTINUED | OUTPATIENT
Start: 2019-11-21 | End: 2019-11-21

## 2019-11-21 RX ORDER — POTASSIUM CHLORIDE 20 MEQ/1
40 TABLET, EXTENDED RELEASE ORAL PRN
Status: DISCONTINUED | OUTPATIENT
Start: 2019-11-21 | End: 2019-11-22 | Stop reason: HOSPADM

## 2019-11-21 RX ORDER — OXYCODONE HYDROCHLORIDE AND ACETAMINOPHEN 5; 325 MG/1; MG/1
1 TABLET ORAL EVERY 4 HOURS PRN
Status: DISCONTINUED | OUTPATIENT
Start: 2019-11-21 | End: 2019-11-22 | Stop reason: HOSPADM

## 2019-11-21 RX ORDER — AMINOPHYLLINE DIHYDRATE 25 MG/ML
50 INJECTION, SOLUTION INTRAVENOUS PRN
Status: DISCONTINUED | OUTPATIENT
Start: 2019-11-21 | End: 2019-11-21

## 2019-11-21 RX ORDER — METOPROLOL TARTRATE 5 MG/5ML
5 INJECTION INTRAVENOUS EVERY 5 MIN PRN
Status: DISCONTINUED | OUTPATIENT
Start: 2019-11-21 | End: 2019-11-21

## 2019-11-21 RX ORDER — ASPIRIN 81 MG/1
162 TABLET ORAL ONCE
Status: DISCONTINUED | OUTPATIENT
Start: 2019-11-21 | End: 2019-11-22 | Stop reason: HOSPADM

## 2019-11-21 RX ORDER — TAMSULOSIN HYDROCHLORIDE 0.4 MG/1
0.4 CAPSULE ORAL DAILY
Status: DISCONTINUED | OUTPATIENT
Start: 2019-11-21 | End: 2019-11-22 | Stop reason: HOSPADM

## 2019-11-21 RX ORDER — POTASSIUM CHLORIDE 7.45 MG/ML
10 INJECTION INTRAVENOUS PRN
Status: DISCONTINUED | OUTPATIENT
Start: 2019-11-21 | End: 2019-11-22 | Stop reason: HOSPADM

## 2019-11-21 RX ORDER — LOSARTAN POTASSIUM AND HYDROCHLOROTHIAZIDE 12.5; 1 MG/1; MG/1
1 TABLET ORAL DAILY
Status: DISCONTINUED | OUTPATIENT
Start: 2019-11-21 | End: 2019-11-22 | Stop reason: HOSPADM

## 2019-11-21 RX ORDER — AMLODIPINE BESYLATE 10 MG/1
5 TABLET ORAL DAILY
Status: DISCONTINUED | OUTPATIENT
Start: 2019-11-21 | End: 2019-11-22 | Stop reason: HOSPADM

## 2019-11-21 RX ORDER — METOPROLOL TARTRATE 5 MG/5ML
5 INJECTION INTRAVENOUS EVERY 6 HOURS PRN
Status: DISCONTINUED | OUTPATIENT
Start: 2019-11-21 | End: 2019-11-22 | Stop reason: HOSPADM

## 2019-11-21 RX ORDER — METOPROLOL TARTRATE 100 MG/1
50 TABLET ORAL 2 TIMES DAILY
Status: DISCONTINUED | OUTPATIENT
Start: 2019-11-21 | End: 2019-11-22 | Stop reason: HOSPADM

## 2019-11-21 RX ORDER — METOPROLOL TARTRATE 50 MG/1
50 TABLET, FILM COATED ORAL 2 TIMES DAILY
Status: DISCONTINUED | OUTPATIENT
Start: 2019-11-21 | End: 2019-11-21

## 2019-11-21 RX ORDER — AMLODIPINE BESYLATE 5 MG/1
5 TABLET ORAL DAILY
Status: DISCONTINUED | OUTPATIENT
Start: 2019-11-21 | End: 2019-11-21

## 2019-11-21 RX ORDER — LOSARTAN POTASSIUM 50 MG/1
100 TABLET ORAL DAILY
Status: DISCONTINUED | OUTPATIENT
Start: 2019-11-21 | End: 2019-11-21

## 2019-11-21 RX ADMIN — FAMOTIDINE 20 MG: 20 TABLET ORAL at 22:03

## 2019-11-21 RX ADMIN — OXYCODONE HYDROCHLORIDE AND ACETAMINOPHEN 1 TABLET: 5; 325 TABLET ORAL at 09:06

## 2019-11-21 RX ADMIN — METOPROLOL TARTRATE 5 MG: 1 INJECTION, SOLUTION INTRAVENOUS at 18:48

## 2019-11-21 RX ADMIN — AMLODIPINE BESYLATE 5 MG: 10 TABLET ORAL at 12:21

## 2019-11-21 RX ADMIN — METOPROLOL TARTRATE 50 MG: 100 TABLET ORAL at 22:03

## 2019-11-21 RX ADMIN — LOSARTAN POTASSIUM AND HYDROCHLOROTHIAZIDE 1 TABLET: 12.5; 1 TABLET ORAL at 12:21

## 2019-11-21 RX ADMIN — TETRAKIS(2-METHOXYISOBUTYLISOCYANIDE)COPPER(I) TETRAFLUOROBORATE 10.8 MILLICURIE: 1 INJECTION, POWDER, LYOPHILIZED, FOR SOLUTION INTRAVENOUS at 08:14

## 2019-11-21 RX ADMIN — METOPROLOL TARTRATE 50 MG: 100 TABLET ORAL at 12:21

## 2019-11-21 RX ADMIN — BUSPIRONE HYDROCHLORIDE 5 MG: 15 TABLET ORAL at 12:21

## 2019-11-21 RX ADMIN — ATORVASTATIN CALCIUM 20 MG: 20 TABLET, FILM COATED ORAL at 12:21

## 2019-11-21 RX ADMIN — Medication 10 ML: at 08:14

## 2019-11-21 RX ADMIN — Medication 5 MG: at 22:06

## 2019-11-21 RX ADMIN — PERFLUTREN 2.2 MG: 6.52 INJECTION, SUSPENSION INTRAVENOUS at 12:03

## 2019-11-21 ASSESSMENT — ENCOUNTER SYMPTOMS
SINUS PRESSURE: 0
CONSTIPATION: 0
COLOR CHANGE: 0
SHORTNESS OF BREATH: 1
RHINORRHEA: 0
EYE REDNESS: 0
SINUS PAIN: 0
NAUSEA: 0
ABDOMINAL PAIN: 0
VOMITING: 0
CHEST TIGHTNESS: 0

## 2019-11-21 ASSESSMENT — PAIN SCALES - GENERAL
PAINLEVEL_OUTOF10: 6
PAINLEVEL_OUTOF10: 10
PAINLEVEL_OUTOF10: 6
PAINLEVEL_OUTOF10: 3

## 2019-11-21 ASSESSMENT — PAIN DESCRIPTION - DESCRIPTORS: DESCRIPTORS: ACHING

## 2019-11-21 ASSESSMENT — HEART SCORE: ECG: 0

## 2019-11-21 ASSESSMENT — PAIN DESCRIPTION - LOCATION: LOCATION: CHEST

## 2019-11-21 ASSESSMENT — PAIN DESCRIPTION - FREQUENCY: FREQUENCY: CONTINUOUS

## 2019-11-22 ENCOUNTER — APPOINTMENT (OUTPATIENT)
Dept: NUCLEAR MEDICINE | Age: 74
End: 2019-11-22
Payer: MEDICARE

## 2019-11-22 VITALS
WEIGHT: 223.33 LBS | OXYGEN SATURATION: 93 % | SYSTOLIC BLOOD PRESSURE: 159 MMHG | BODY MASS INDEX: 30.25 KG/M2 | HEIGHT: 72 IN | HEART RATE: 69 BPM | TEMPERATURE: 97.6 F | DIASTOLIC BLOOD PRESSURE: 78 MMHG | RESPIRATION RATE: 20 BRPM

## 2019-11-22 LAB
ABSOLUTE EOS #: 0.4 K/UL (ref 0–0.4)
ABSOLUTE IMMATURE GRANULOCYTE: ABNORMAL K/UL (ref 0–0.3)
ABSOLUTE LYMPH #: 2.1 K/UL (ref 1–4.8)
ABSOLUTE MONO #: 0.9 K/UL (ref 0.1–1.3)
ALBUMIN SERPL-MCNC: 3.7 G/DL (ref 3.5–5.2)
ALBUMIN/GLOBULIN RATIO: ABNORMAL (ref 1–2.5)
ALP BLD-CCNC: 115 U/L (ref 40–129)
ALT SERPL-CCNC: 19 U/L (ref 5–41)
ANION GAP SERPL CALCULATED.3IONS-SCNC: 12 MMOL/L (ref 9–17)
AST SERPL-CCNC: 16 U/L
BASOPHILS # BLD: 1 % (ref 0–2)
BASOPHILS ABSOLUTE: 0.1 K/UL (ref 0–0.2)
BILIRUB SERPL-MCNC: 1.08 MG/DL (ref 0.3–1.2)
BUN BLDV-MCNC: 20 MG/DL (ref 8–23)
BUN/CREAT BLD: ABNORMAL (ref 9–20)
CALCIUM SERPL-MCNC: 10 MG/DL (ref 8.6–10.4)
CHLORIDE BLD-SCNC: 101 MMOL/L (ref 98–107)
CO2: 27 MMOL/L (ref 20–31)
CREAT SERPL-MCNC: 0.83 MG/DL (ref 0.7–1.2)
DIFFERENTIAL TYPE: ABNORMAL
EOSINOPHILS RELATIVE PERCENT: 4 % (ref 0–4)
GFR AFRICAN AMERICAN: >60 ML/MIN
GFR NON-AFRICAN AMERICAN: >60 ML/MIN
GFR SERPL CREATININE-BSD FRML MDRD: ABNORMAL ML/MIN/{1.73_M2}
GFR SERPL CREATININE-BSD FRML MDRD: ABNORMAL ML/MIN/{1.73_M2}
GLUCOSE BLD-MCNC: 119 MG/DL (ref 70–99)
HCT VFR BLD CALC: 43 % (ref 41–53)
HEMOGLOBIN: 14.4 G/DL (ref 13.5–17.5)
IMMATURE GRANULOCYTES: ABNORMAL %
LV EF: 37 %
LVEF MODALITY: NORMAL
LYMPHOCYTES # BLD: 19 % (ref 24–44)
MCH RBC QN AUTO: 32.5 PG (ref 26–34)
MCHC RBC AUTO-ENTMCNC: 33.6 G/DL (ref 31–37)
MCV RBC AUTO: 96.7 FL (ref 80–100)
MONOCYTES # BLD: 8 % (ref 1–7)
NRBC AUTOMATED: ABNORMAL PER 100 WBC
PDW BLD-RTO: 12.9 % (ref 11.5–14.9)
PLATELET # BLD: 251 K/UL (ref 150–450)
PLATELET ESTIMATE: ABNORMAL
PMV BLD AUTO: 8.6 FL (ref 6–12)
POTASSIUM SERPL-SCNC: 3.9 MMOL/L (ref 3.7–5.3)
RBC # BLD: 4.44 M/UL (ref 4.5–5.9)
RBC # BLD: ABNORMAL 10*6/UL
SEG NEUTROPHILS: 68 % (ref 36–66)
SEGMENTED NEUTROPHILS ABSOLUTE COUNT: 7.9 K/UL (ref 1.3–9.1)
SODIUM BLD-SCNC: 140 MMOL/L (ref 135–144)
TOTAL PROTEIN: 7.2 G/DL (ref 6.4–8.3)
WBC # BLD: 11.5 K/UL (ref 3.5–11)
WBC # BLD: ABNORMAL 10*3/UL

## 2019-11-22 PROCEDURE — 6360000002 HC RX W HCPCS: Performed by: INTERNAL MEDICINE

## 2019-11-22 PROCEDURE — 96374 THER/PROPH/DIAG INJ IV PUSH: CPT

## 2019-11-22 PROCEDURE — 85025 COMPLETE CBC W/AUTO DIFF WBC: CPT

## 2019-11-22 PROCEDURE — 80053 COMPREHEN METABOLIC PANEL: CPT

## 2019-11-22 PROCEDURE — A9500 TC99M SESTAMIBI: HCPCS | Performed by: INTERNAL MEDICINE

## 2019-11-22 PROCEDURE — 36415 COLL VENOUS BLD VENIPUNCTURE: CPT

## 2019-11-22 PROCEDURE — 2580000003 HC RX 258: Performed by: INTERNAL MEDICINE

## 2019-11-22 PROCEDURE — G0378 HOSPITAL OBSERVATION PER HR: HCPCS

## 2019-11-22 PROCEDURE — 78452 HT MUSCLE IMAGE SPECT MULT: CPT

## 2019-11-22 PROCEDURE — 93017 CV STRESS TEST TRACING ONLY: CPT

## 2019-11-22 PROCEDURE — 3430000000 HC RX DIAGNOSTIC RADIOPHARMACEUTICAL: Performed by: INTERNAL MEDICINE

## 2019-11-22 RX ORDER — ATORVASTATIN CALCIUM 20 MG/1
20 TABLET, FILM COATED ORAL DAILY
Qty: 30 TABLET | Refills: 3 | Status: CANCELLED | OUTPATIENT
Start: 2019-11-22

## 2019-11-22 RX ORDER — ALBUTEROL SULFATE 90 UG/1
2 AEROSOL, METERED RESPIRATORY (INHALATION) PRN
Status: ACTIVE | OUTPATIENT
Start: 2019-11-22 | End: 2019-11-22

## 2019-11-22 RX ORDER — AMINOPHYLLINE DIHYDRATE 25 MG/ML
50 INJECTION, SOLUTION INTRAVENOUS PRN
Status: ACTIVE | OUTPATIENT
Start: 2019-11-22 | End: 2019-11-22

## 2019-11-22 RX ORDER — ATROPINE SULFATE 0.1 MG/ML
0.5 INJECTION INTRAVENOUS EVERY 5 MIN PRN
Status: ACTIVE | OUTPATIENT
Start: 2019-11-22 | End: 2019-11-22

## 2019-11-22 RX ORDER — NITROGLYCERIN 0.4 MG/1
0.4 TABLET SUBLINGUAL EVERY 5 MIN PRN
Status: ACTIVE | OUTPATIENT
Start: 2019-11-22 | End: 2019-11-22

## 2019-11-22 RX ORDER — SODIUM CHLORIDE 0.9 % (FLUSH) 0.9 %
10 SYRINGE (ML) INJECTION PRN
Status: DISCONTINUED | OUTPATIENT
Start: 2019-11-22 | End: 2019-11-22

## 2019-11-22 RX ORDER — METOPROLOL TARTRATE 5 MG/5ML
5 INJECTION INTRAVENOUS EVERY 5 MIN PRN
Status: ACTIVE | OUTPATIENT
Start: 2019-11-22 | End: 2019-11-22

## 2019-11-22 RX ORDER — SODIUM CHLORIDE 9 MG/ML
500 INJECTION, SOLUTION INTRAVENOUS CONTINUOUS PRN
Status: ACTIVE | OUTPATIENT
Start: 2019-11-22 | End: 2019-11-22

## 2019-11-22 RX ADMIN — TETRAKIS(2-METHOXYISOBUTYLISOCYANIDE)COPPER(I) TETRAFLUOROBORATE 9.7 MILLICURIE: 1 INJECTION, POWDER, LYOPHILIZED, FOR SOLUTION INTRAVENOUS at 08:55

## 2019-11-22 RX ADMIN — Medication 10 ML: at 09:37

## 2019-11-22 RX ADMIN — BUSPIRONE HYDROCHLORIDE 5 MG: 15 TABLET ORAL at 16:42

## 2019-11-22 RX ADMIN — LOSARTAN POTASSIUM AND HYDROCHLOROTHIAZIDE 1 TABLET: 12.5; 1 TABLET ORAL at 13:21

## 2019-11-22 RX ADMIN — TETRAKIS(2-METHOXYISOBUTYLISOCYANIDE)COPPER(I) TETRAFLUOROBORATE 33 MILLICURIE: 1 INJECTION, POWDER, LYOPHILIZED, FOR SOLUTION INTRAVENOUS at 10:22

## 2019-11-22 RX ADMIN — METOPROLOL TARTRATE 50 MG: 100 TABLET ORAL at 13:22

## 2019-11-22 RX ADMIN — ATORVASTATIN CALCIUM 20 MG: 20 TABLET, FILM COATED ORAL at 13:21

## 2019-11-22 RX ADMIN — Medication 10 ML: at 08:55

## 2019-11-22 RX ADMIN — REGADENOSON 0.4 MG: 0.08 INJECTION, SOLUTION INTRAVENOUS at 10:20

## 2019-11-22 RX ADMIN — Medication 10 ML: at 10:20

## 2019-11-22 RX ADMIN — FENTANYL CITRATE 25 MCG: 50 INJECTION, SOLUTION INTRAMUSCULAR; INTRAVENOUS at 08:45

## 2019-11-22 RX ADMIN — AMLODIPINE BESYLATE 5 MG: 10 TABLET ORAL at 13:20

## 2019-11-22 ASSESSMENT — ENCOUNTER SYMPTOMS
ANAL BLEEDING: 0
VOICE CHANGE: 0
PHOTOPHOBIA: 0
EYE ITCHING: 0
CHEST TIGHTNESS: 0
FACIAL SWELLING: 0
COLOR CHANGE: 0
STRIDOR: 0
APNEA: 0
ABDOMINAL DISTENTION: 0
CHOKING: 0
RECTAL PAIN: 0

## 2019-11-22 ASSESSMENT — PAIN SCALES - GENERAL: PAINLEVEL_OUTOF10: 2

## 2020-02-26 ENCOUNTER — APPOINTMENT (OUTPATIENT)
Dept: CT IMAGING | Age: 75
DRG: 092 | End: 2020-02-26
Payer: MEDICARE

## 2020-02-26 ENCOUNTER — HOSPITAL ENCOUNTER (INPATIENT)
Age: 75
LOS: 6 days | Discharge: NURSING, REHAB OR LONG-TERM CARE | DRG: 092 | End: 2020-03-03
Attending: EMERGENCY MEDICINE | Admitting: FAMILY MEDICINE
Payer: MEDICARE

## 2020-02-26 ENCOUNTER — APPOINTMENT (OUTPATIENT)
Dept: GENERAL RADIOLOGY | Age: 75
DRG: 092 | End: 2020-02-26
Payer: MEDICARE

## 2020-02-26 PROBLEM — R29.6 FREQUENT FALLS: Status: ACTIVE | Noted: 2020-02-26

## 2020-02-26 LAB
ABSOLUTE EOS #: 0.2 K/UL (ref 0–0.4)
ABSOLUTE IMMATURE GRANULOCYTE: ABNORMAL K/UL (ref 0–0.3)
ABSOLUTE LYMPH #: 1.8 K/UL (ref 1–4.8)
ABSOLUTE MONO #: 1 K/UL (ref 0.1–1.3)
ANION GAP SERPL CALCULATED.3IONS-SCNC: 11 MMOL/L (ref 9–17)
BASOPHILS # BLD: 1 % (ref 0–2)
BASOPHILS ABSOLUTE: 0.1 K/UL (ref 0–0.2)
BNP INTERPRETATION: ABNORMAL
BUN BLDV-MCNC: 30 MG/DL (ref 8–23)
BUN/CREAT BLD: ABNORMAL (ref 9–20)
CALCIUM SERPL-MCNC: 10.1 MG/DL (ref 8.6–10.4)
CHLORIDE BLD-SCNC: 102 MMOL/L (ref 98–107)
CO2: 26 MMOL/L (ref 20–31)
CREAT SERPL-MCNC: 1.02 MG/DL (ref 0.7–1.2)
DIFFERENTIAL TYPE: ABNORMAL
EOSINOPHILS RELATIVE PERCENT: 1 % (ref 0–4)
GFR AFRICAN AMERICAN: >60 ML/MIN
GFR NON-AFRICAN AMERICAN: >60 ML/MIN
GFR SERPL CREATININE-BSD FRML MDRD: ABNORMAL ML/MIN/{1.73_M2}
GFR SERPL CREATININE-BSD FRML MDRD: ABNORMAL ML/MIN/{1.73_M2}
GLUCOSE BLD-MCNC: 119 MG/DL (ref 70–99)
HCT VFR BLD CALC: 42.2 % (ref 41–53)
HEMOGLOBIN: 14.2 G/DL (ref 13.5–17.5)
IMMATURE GRANULOCYTES: ABNORMAL %
LYMPHOCYTES # BLD: 15 % (ref 24–44)
MCH RBC QN AUTO: 32.2 PG (ref 26–34)
MCHC RBC AUTO-ENTMCNC: 33.6 G/DL (ref 31–37)
MCV RBC AUTO: 95.7 FL (ref 80–100)
MONOCYTES # BLD: 8 % (ref 1–7)
NRBC AUTOMATED: ABNORMAL PER 100 WBC
PDW BLD-RTO: 12.9 % (ref 11.5–14.9)
PLATELET # BLD: 280 K/UL (ref 150–450)
PLATELET ESTIMATE: ABNORMAL
PMV BLD AUTO: 8.3 FL (ref 6–12)
POTASSIUM SERPL-SCNC: 4 MMOL/L (ref 3.7–5.3)
PRO-BNP: 510 PG/ML
RBC # BLD: 4.41 M/UL (ref 4.5–5.9)
RBC # BLD: ABNORMAL 10*6/UL
SEG NEUTROPHILS: 75 % (ref 36–66)
SEGMENTED NEUTROPHILS ABSOLUTE COUNT: 9.1 K/UL (ref 1.3–9.1)
SODIUM BLD-SCNC: 139 MMOL/L (ref 135–144)
TROPONIN INTERP: NORMAL
TROPONIN T: NORMAL NG/ML
TROPONIN, HIGH SENSITIVITY: 20 NG/L (ref 0–22)
WBC # BLD: 12.1 K/UL (ref 3.5–11)
WBC # BLD: ABNORMAL 10*3/UL

## 2020-02-26 PROCEDURE — 71045 X-RAY EXAM CHEST 1 VIEW: CPT

## 2020-02-26 PROCEDURE — 84484 ASSAY OF TROPONIN QUANT: CPT

## 2020-02-26 PROCEDURE — 36415 COLL VENOUS BLD VENIPUNCTURE: CPT

## 2020-02-26 PROCEDURE — 72131 CT LUMBAR SPINE W/O DYE: CPT

## 2020-02-26 PROCEDURE — 90715 TDAP VACCINE 7 YRS/> IM: CPT | Performed by: EMERGENCY MEDICINE

## 2020-02-26 PROCEDURE — 80048 BASIC METABOLIC PNL TOTAL CA: CPT

## 2020-02-26 PROCEDURE — 70450 CT HEAD/BRAIN W/O DYE: CPT

## 2020-02-26 PROCEDURE — 85025 COMPLETE CBC W/AUTO DIFF WBC: CPT

## 2020-02-26 PROCEDURE — 6360000002 HC RX W HCPCS: Performed by: EMERGENCY MEDICINE

## 2020-02-26 PROCEDURE — 90471 IMMUNIZATION ADMIN: CPT | Performed by: EMERGENCY MEDICINE

## 2020-02-26 PROCEDURE — 6370000000 HC RX 637 (ALT 250 FOR IP): Performed by: EMERGENCY MEDICINE

## 2020-02-26 PROCEDURE — 1200000000 HC SEMI PRIVATE

## 2020-02-26 PROCEDURE — 99285 EMERGENCY DEPT VISIT HI MDM: CPT

## 2020-02-26 PROCEDURE — 83880 ASSAY OF NATRIURETIC PEPTIDE: CPT

## 2020-02-26 PROCEDURE — 93005 ELECTROCARDIOGRAM TRACING: CPT | Performed by: EMERGENCY MEDICINE

## 2020-02-26 RX ORDER — TRAMADOL HYDROCHLORIDE 50 MG/1
50 TABLET ORAL EVERY 6 HOURS PRN
Status: DISCONTINUED | OUTPATIENT
Start: 2020-02-26 | End: 2020-02-27

## 2020-02-26 RX ORDER — METOPROLOL TARTRATE 50 MG/1
50 TABLET, FILM COATED ORAL 2 TIMES DAILY
Status: DISCONTINUED | OUTPATIENT
Start: 2020-02-27 | End: 2020-03-03 | Stop reason: HOSPADM

## 2020-02-26 RX ORDER — ONDANSETRON 4 MG/1
4 TABLET, ORALLY DISINTEGRATING ORAL EVERY 8 HOURS PRN
Status: DISCONTINUED | OUTPATIENT
Start: 2020-02-26 | End: 2020-03-03 | Stop reason: HOSPADM

## 2020-02-26 RX ORDER — ACETAMINOPHEN 500 MG
1000 TABLET ORAL EVERY 8 HOURS PRN
Status: DISCONTINUED | OUTPATIENT
Start: 2020-02-26 | End: 2020-03-03 | Stop reason: HOSPADM

## 2020-02-26 RX ORDER — ACETAMINOPHEN 325 MG/1
650 TABLET ORAL ONCE
Status: COMPLETED | OUTPATIENT
Start: 2020-02-26 | End: 2020-02-26

## 2020-02-26 RX ORDER — BUSPIRONE HYDROCHLORIDE 15 MG/1
15 TABLET ORAL ONCE
Status: COMPLETED | OUTPATIENT
Start: 2020-02-26 | End: 2020-02-26

## 2020-02-26 RX ADMIN — TETANUS TOXOID, REDUCED DIPHTHERIA TOXOID AND ACELLULAR PERTUSSIS VACCINE, ADSORBED 0.5 ML: 5; 2.5; 8; 8; 2.5 SUSPENSION INTRAMUSCULAR at 21:00

## 2020-02-26 RX ADMIN — BUSPIRONE HYDROCHLORIDE 7.5 MG: 15 TABLET ORAL at 21:19

## 2020-02-26 RX ADMIN — ACETAMINOPHEN 650 MG: 325 TABLET, FILM COATED ORAL at 21:00

## 2020-02-26 ASSESSMENT — ENCOUNTER SYMPTOMS
VOMITING: 0
VOICE CHANGE: 0
TROUBLE SWALLOWING: 0
SHORTNESS OF BREATH: 0
ABDOMINAL PAIN: 0
BACK PAIN: 1
NAUSEA: 0

## 2020-02-26 ASSESSMENT — PAIN DESCRIPTION - ORIENTATION: ORIENTATION: LOWER

## 2020-02-26 ASSESSMENT — PAIN DESCRIPTION - LOCATION: LOCATION: BACK

## 2020-02-26 ASSESSMENT — PAIN SCALES - GENERAL: PAINLEVEL_OUTOF10: 5

## 2020-02-27 PROBLEM — R26.2 AMBULATORY DYSFUNCTION: Status: ACTIVE | Noted: 2020-02-27

## 2020-02-27 PROBLEM — R27.0 ATAXIA: Status: ACTIVE | Noted: 2020-02-27

## 2020-02-27 PROBLEM — F19.951 HALLUCINATION, DRUG-INDUCED (HCC): Status: ACTIVE | Noted: 2020-02-27

## 2020-02-27 LAB
EKG ATRIAL RATE: 68 BPM
EKG Q-T INTERVAL: 414 MS
EKG QRS DURATION: 98 MS
EKG QTC CALCULATION (BAZETT): 416 MS
EKG R AXIS: 36 DEGREES
EKG T AXIS: 59 DEGREES
EKG VENTRICULAR RATE: 61 BPM

## 2020-02-27 PROCEDURE — 6370000000 HC RX 637 (ALT 250 FOR IP): Performed by: EMERGENCY MEDICINE

## 2020-02-27 PROCEDURE — 2580000003 HC RX 258: Performed by: EMERGENCY MEDICINE

## 2020-02-27 PROCEDURE — 1200000000 HC SEMI PRIVATE

## 2020-02-27 RX ORDER — TAMSULOSIN HYDROCHLORIDE 0.4 MG/1
0.4 CAPSULE ORAL DAILY
Status: DISCONTINUED | OUTPATIENT
Start: 2020-02-27 | End: 2020-03-03 | Stop reason: HOSPADM

## 2020-02-27 RX ORDER — SODIUM CHLORIDE 0.9 % (FLUSH) 0.9 %
10 SYRINGE (ML) INJECTION PRN
Status: DISCONTINUED | OUTPATIENT
Start: 2020-02-27 | End: 2020-03-03 | Stop reason: HOSPADM

## 2020-02-27 RX ORDER — SODIUM CHLORIDE 0.9 % (FLUSH) 0.9 %
10 SYRINGE (ML) INJECTION EVERY 12 HOURS SCHEDULED
Status: DISCONTINUED | OUTPATIENT
Start: 2020-02-27 | End: 2020-03-03 | Stop reason: HOSPADM

## 2020-02-27 RX ORDER — HYDROCHLOROTHIAZIDE 25 MG/1
12.5 TABLET ORAL DAILY
Status: DISCONTINUED | OUTPATIENT
Start: 2020-02-27 | End: 2020-03-03 | Stop reason: HOSPADM

## 2020-02-27 RX ORDER — DOCUSATE SODIUM 100 MG/1
100 CAPSULE, LIQUID FILLED ORAL PRN
Status: DISCONTINUED | OUTPATIENT
Start: 2020-02-27 | End: 2020-03-03 | Stop reason: HOSPADM

## 2020-02-27 RX ORDER — BUSPIRONE HYDROCHLORIDE 5 MG/1
5 TABLET ORAL 2 TIMES DAILY
Status: DISCONTINUED | OUTPATIENT
Start: 2020-02-27 | End: 2020-03-01

## 2020-02-27 RX ORDER — LOSARTAN POTASSIUM AND HYDROCHLOROTHIAZIDE 12.5; 1 MG/1; MG/1
1 TABLET ORAL DAILY
Status: DISCONTINUED | OUTPATIENT
Start: 2020-02-27 | End: 2020-02-27

## 2020-02-27 RX ORDER — CHOLECALCIFEROL (VITAMIN D3) 125 MCG
5 CAPSULE ORAL DAILY PRN
Status: DISCONTINUED | OUTPATIENT
Start: 2020-02-27 | End: 2020-02-27 | Stop reason: CLARIF

## 2020-02-27 RX ORDER — LOSARTAN POTASSIUM 50 MG/1
100 TABLET ORAL DAILY
Status: DISCONTINUED | OUTPATIENT
Start: 2020-02-27 | End: 2020-03-03 | Stop reason: HOSPADM

## 2020-02-27 RX ORDER — AMLODIPINE BESYLATE 5 MG/1
5 TABLET ORAL DAILY
Status: DISCONTINUED | OUTPATIENT
Start: 2020-02-27 | End: 2020-03-03 | Stop reason: HOSPADM

## 2020-02-27 RX ORDER — CALCIUM CARBONATE 200(500)MG
1 TABLET,CHEWABLE ORAL 2 TIMES DAILY PRN
Status: DISCONTINUED | OUTPATIENT
Start: 2020-02-27 | End: 2020-03-03 | Stop reason: HOSPADM

## 2020-02-27 RX ORDER — M-VIT,TX,IRON,MINS/CALC/FOLIC 27MG-0.4MG
1 TABLET ORAL DAILY
Status: DISCONTINUED | OUTPATIENT
Start: 2020-02-27 | End: 2020-03-03 | Stop reason: HOSPADM

## 2020-02-27 RX ORDER — ASPIRIN 325 MG
162 TABLET ORAL DAILY
Status: DISCONTINUED | OUTPATIENT
Start: 2020-02-27 | End: 2020-03-03 | Stop reason: HOSPADM

## 2020-02-27 RX ORDER — ATORVASTATIN CALCIUM 20 MG/1
20 TABLET, FILM COATED ORAL DAILY
Status: DISCONTINUED | OUTPATIENT
Start: 2020-02-27 | End: 2020-03-03 | Stop reason: HOSPADM

## 2020-02-27 RX ADMIN — APIXABAN 5 MG: 5 TABLET, FILM COATED ORAL at 23:07

## 2020-02-27 RX ADMIN — Medication 10 ML: at 10:28

## 2020-02-27 RX ADMIN — MULTIPLE VITAMINS W/ MINERALS TAB 1 TABLET: TAB at 10:25

## 2020-02-27 RX ADMIN — BUSPIRONE HYDROCHLORIDE 5 MG: 5 TABLET ORAL at 16:12

## 2020-02-27 RX ADMIN — TRAMADOL HYDROCHLORIDE 50 MG: 50 TABLET ORAL at 01:06

## 2020-02-27 RX ADMIN — METOPROLOL TARTRATE 50 MG: 50 TABLET, FILM COATED ORAL at 10:25

## 2020-02-27 RX ADMIN — HYDROCHLOROTHIAZIDE 12.5 MG: 25 TABLET ORAL at 10:26

## 2020-02-27 RX ADMIN — APIXABAN 5 MG: 5 TABLET, FILM COATED ORAL at 03:10

## 2020-02-27 RX ADMIN — METOPROLOL TARTRATE 50 MG: 50 TABLET, FILM COATED ORAL at 23:07

## 2020-02-27 RX ADMIN — APIXABAN 5 MG: 5 TABLET, FILM COATED ORAL at 10:26

## 2020-02-27 RX ADMIN — TAMSULOSIN HYDROCHLORIDE 0.4 MG: 0.4 CAPSULE ORAL at 10:25

## 2020-02-27 RX ADMIN — ACETAMINOPHEN 1000 MG: 500 TABLET, FILM COATED ORAL at 14:28

## 2020-02-27 RX ADMIN — METOPROLOL TARTRATE 50 MG: 50 TABLET, FILM COATED ORAL at 01:05

## 2020-02-27 RX ADMIN — LOSARTAN POTASSIUM 100 MG: 50 TABLET, FILM COATED ORAL at 10:26

## 2020-02-27 RX ADMIN — BUSPIRONE HYDROCHLORIDE 5 MG: 5 TABLET ORAL at 10:26

## 2020-02-27 RX ADMIN — ATORVASTATIN CALCIUM 20 MG: 20 TABLET, FILM COATED ORAL at 10:25

## 2020-02-27 RX ADMIN — ASPIRIN 325 MG ORAL TABLET 162 MG: 325 PILL ORAL at 10:26

## 2020-02-27 RX ADMIN — Medication 10 ML: at 23:26

## 2020-02-27 RX ADMIN — Medication 1 MG: at 23:06

## 2020-02-27 RX ADMIN — Medication 1 MG: at 03:10

## 2020-02-27 RX ADMIN — AMLODIPINE BESYLATE 5 MG: 5 TABLET ORAL at 10:25

## 2020-02-27 ASSESSMENT — ENCOUNTER SYMPTOMS
ANAL BLEEDING: 0
ABDOMINAL DISTENTION: 0
PHOTOPHOBIA: 0
CHOKING: 0
RECTAL PAIN: 0
CHEST TIGHTNESS: 0
APNEA: 0
COLOR CHANGE: 0
FACIAL SWELLING: 0
VOICE CHANGE: 0
EYE ITCHING: 0
STRIDOR: 0

## 2020-02-27 ASSESSMENT — PAIN DESCRIPTION - ORIENTATION: ORIENTATION: LOWER

## 2020-02-27 ASSESSMENT — PAIN SCALES - GENERAL
PAINLEVEL_OUTOF10: 5
PAINLEVEL_OUTOF10: 10
PAINLEVEL_OUTOF10: 0
PAINLEVEL_OUTOF10: 0

## 2020-02-27 ASSESSMENT — PAIN DESCRIPTION - LOCATION: LOCATION: BACK

## 2020-02-27 NOTE — ED NOTES
Bed: 08  Expected date: 2/26/20  Expected time: 7:40 PM  Means of arrival:   Comments:  Rebel Snyder RN  02/1945

## 2020-02-27 NOTE — PROGRESS NOTES
Pt stated to the PCA that he wanted a gun to shoot himself, PCA contacted the writer and updated her of the statement. Writer assessed the situation and spoke to the pt regarding his statement the pt stated that he does not like to lay in bed and that he keeps peeing on himself, he told the writer to wipe the comment from her mind that he does not want to shoot himself that he has no plan and that he does not want to die. The writer spoke to the clin lead and updated her of the situation as well.  Writer will continue to monitor and verify that the pt does not want have suicidal ideations

## 2020-02-27 NOTE — ED PROVIDER NOTES
chest pain and palpitations. Gastrointestinal: Negative for abdominal pain, nausea and vomiting. Musculoskeletal: Positive for back pain. Negative for neck pain. Skin: Positive for wound. Neurological: Negative for dizziness, syncope, weakness, light-headedness, numbness and headaches. Hematological: Bruises/bleeds easily. Psychiatric/Behavioral: Negative for confusion. PHYSICAL EXAM   (up to 7 for level 4, 8 or more for level 5)    VITALS:   Vitals:    02/26/20 2115 02/26/20 2130 02/26/20 2145 02/26/20 2200   BP: (!) 158/84 (!) 167/81 (!) 173/97 (!) 155/94   Pulse: 70 62  67   Resp: 16 14  20   Temp:       TempSrc:       SpO2: 97% 96%  95%   Weight:       Height:           Physical Exam  Vitals signs and nursing note reviewed. Constitutional:       General: He is not in acute distress. Appearance: He is well-developed. He is not diaphoretic. HENT:      Head: Normocephalic and atraumatic. Right Ear: External ear normal.      Left Ear: External ear normal.      Nose: Nose normal.      Mouth/Throat:      Mouth: Mucous membranes are moist.   Eyes:      Extraocular Movements: Extraocular movements intact. Conjunctiva/sclera: Conjunctivae normal.      Pupils: Pupils are equal, round, and reactive to light. Neck:      Musculoskeletal: Normal range of motion and neck supple. Cardiovascular:      Rate and Rhythm: Normal rate. Rhythm irregularly irregular. Pulses: Normal pulses. Heart sounds: Normal heart sounds. No murmur. Pulmonary:      Effort: Pulmonary effort is normal. No respiratory distress. Breath sounds: Normal breath sounds. No wheezing, rhonchi or rales. Comments: No chest wall crepitus or deformities. Chest:      Chest wall: No tenderness. Abdominal:      General: Bowel sounds are normal. There is no distension. Palpations: Abdomen is soft. Tenderness: There is no abdominal tenderness.  There is no right CVA tenderness, left CVA aspect of the left humeral head. 1. Focal subsegmental atelectasis or scar lateral lower left lung. 2. Calcific atherosclerosis aorta. 3. Cardiomegaly. 4. Left glenohumeral joint osteoarthritis. If clinically there is concern of underlying rib fracture, sternal or other acute traumatic abnormality of the chest, then additional evaluation with dedicated imaging of the area of interest versus CT chest should be considered. Ct Head Wo Contrast    Result Date: 2/26/2020  EXAMINATION: CT OF THE HEAD WITHOUT CONTRAST  2/26/2020 8:16 pm TECHNIQUE: CT of the head was performed without the administration of intravenous contrast. Dose modulation, iterative reconstruction, and/or weight based adjustment of the mA/kV was utilized to reduce the radiation dose to as low as reasonably achievable. COMPARISON: March 2018 HISTORY: ORDERING SYSTEM PROVIDED HISTORY: fall, on eliquis TECHNOLOGIST PROVIDED HISTORY: fall, on eliquis Reason for Exam: fall Acuity: Unknown Type of Exam: Unknown FINDINGS: BRAIN/VENTRICLES: Detail is limited due to motion related artifact. Ventricles and sulci are stable. There is no midline shift. Right parietal/occipital encephalomalacia is again noted with surrounding gliosis. There is a questionable small amount of low-density in the posterior cerebellum. This area is limited due to artifact. Low-density is noted in the anterior lentiform nuclei bilaterally. This is stable. Caudate head and external capsule low density foci are again noted. Multifocal low-density in the periventricular and subcortical white matter is again noted. There is no parenchymal or extra-axial hematoma. ORBITS: No acute orbital abnormality is noted SINUSES: No air-fluid levels are noted. Large calcified density in the right frontal ethmoid junction is noted. This is stable. SOFT TISSUES/SKULL:  No acute abnormality of the visualized skull or soft tissues.      Limited exam due to motion Multiple areas of encephalomalacia with surrounding gliotic change Multiple old infarcts Multifocal small-vessel ischemic change No acute hemorrhage     Ct Lumbar Spine Wo Contrast    Result Date: 2/26/2020  EXAMINATION: CT OF THE LUMBAR SPINE WITHOUT CONTRAST  2/26/2020 TECHNIQUE: CT of the lumbar spine was performed without the administration of intravenous contrast. Multiplanar reformatted images are provided for review. Dose modulation, iterative reconstruction, and/or weight based adjustment of the mA/kV was utilized to reduce the radiation dose to as low as reasonably achievable. COMPARISON: None HISTORY: ORDERING SYSTEM PROVIDED HISTORY: fall TECHNOLOGIST PROVIDED HISTORY: fall Reason for Exam: fall; lower back pain FINDINGS: BONES/ALIGNMENT: Slight left convex lumbar curvature is noted. Multiple endplate Schmorl's node deformities are noted. Detail is limited due to artifact from motion. There is no acute displaced fracture. DEGENERATIVE CHANGES: Multilevel degenerative change throughout the lumbar spine is noted. Endplate and facet hypertrophic changes are noted SOFT TISSUES/RETROPERITONEUM: Vascular calcifications are noted. Maximum AP dimension of the aorta on axial image 35 is approximately 3.7 cm. Maximum dimension of the aorta on axial image 55 is approximately 3.3 x 3.2 cm. A large amount of stool is noted in the visualized portions of the colon. There are no pathologically enlarged retroperitoneal lymph nodes. Left psoas muscle atrophy and fatty infiltration is noted. Multifocal posterior paraspinal muscle atrophic change in fatty infiltration is noted. Spinal canal detail is markedly limited. Multifocal disc bulging in the lower thoracic spine and the lumbar spine is noted. There is multilevel suggested canal and foraminal narrowing. Postoperative changes are noted at L3-4. No acute fracture Multilevel degenerative disc disease as described. See above for details of each level.   If more detail is desired, consider MRI Aortic aneurysm. RECOMMENDATIONS: For management of fusiform AAA: * For management of saccular abdominal aortic aneurysms of any size, recommend vascular consultation. Note:  For AAA enlargement of >0.5 cm in 6 months or >1 cm in 1 year, recommend vascular consultation. References: Phuong Rdz Radiol 2013; 87(23):043-376; J Vasc Surg. 2018; 67:2-77       EKG    EKG Interpretation    Interpreted by emergency department physician at 2143    Rhythm: atrial fibrillation - controlled  Rate: 61  Axis: normal  Ectopy: none  Conduction: normal  ST Segments: no acute change  T Waves: no acute change  Q Waves: none    Clinical Impression: non-specific EKG    All EKG's are interpreted by the Emergency Department Physician whoeither signs or Co-signs this chart in the absence of a cardiologist.    EMERGENCY DEPARTMENT COURSE:  ED Course as of Feb 26 2235   Wed Feb 26, 2020 2050 CT Head WO Contrast [AM]   2053 CT Lumbar Spine WO Contrast [AM]   2105 XR CHEST (SINGLE VIEW FRONTAL) [AM]   2109 Discussed results with patient and wife. Discussed possibility of admission due to unsteady gait versus discharge home. Both patient and wife want to have time to discuss but wife does express concern about his instability    [AM]   2136 WBC(!): 12.1 [AM]   2141 Spoke with patient and wife. They wish to be admitted due to patient being unable to independently ambulate at home and the wife being the only one at home to take care for him. Patient has had increasing frequency of falls and is unsteady on his feet.     [AM]   1753 Slightly elevated past baseline   Pro-BNP(!): 510 [AM]   9589 Basic Metabolic Panel w/ Reflex to MG(!):    Glucose 119(!)   BUN 30(!)   Creatinine 1.02   Bun/Cre Ratio NOT REPORTED   Calcium 10.1   Sodium 139   Potassium 4.0   Chloride 102   CO2 26   Anion Gap 11   GFR Non- >60   GFR  >60   GFR Comment        GFR Staging NOT REPORTED [AM]   2210 baseline Troponin:    Troponin, High Sensitivity 20   Troponin T NOT REPORTED   Troponin Interp NOT REPORTED [AM]      ED Course User Index  [AM] Tyesha HectorDO CLARK  Number of Diagnoses or Management Options  AAA (abdominal aortic aneurysm) without rupture [facility]):   Fall, initial encounter:   Skin tear of hand without complication, initial encounter:   Diagnosis management comments: Patient present emergency department with mechanical fall. Patient unable to ambulate steadily after the fall and was experiencing low back pain. On exam patient in no acute distress. Obvious skin tears to bilateral hands. No midline tenderness to palpation but there is paraspinal tenderness in the lumbar area. Due to patient being anticoagulated CT of the head was obtained which was negative for any intracranial bleed. CT of the lumbar spine showed osteoarthritis but no acute fracture. Chest x-ray negative. Wife expressed concern about being the only caretaker at home and patient being unstable and having frequent falls. Basic labs obtained with plan for admission. Troponin at baseline with level of 20. CBC showing mild leukocytosis which is likely reactive. BMP unremarkable. BNP slightly elevated from baseline. EKG showed controlled atrial fibrillation. I spoke with patient's primary care provider who is agreeable to admission. Bridging orders were placed including consult for physical therapy and Occupational Therapy to see patient in the morning.        Amount and/or Complexity of Data Reviewed  Clinical lab tests: ordered and reviewed  Tests in the radiology section of CPT®: reviewed and ordered  Review and summarize past medical records: yes  Discuss the patient with other providers: yes  Independent visualization of images, tracings, or specimens: yes    Patient Progress  Patient progress: stable    CONSULTS:  IP CONSULT TO INTERNAL MEDICINE    CRITICAL CARE:  Please see attending note    FINAL IMPRESSION 1. Fall, initial encounter    2. Skin tear of hand without complication, initial encounter    3. AAA (abdominal aortic aneurysm) without rupture ([facility] [address]       DISPOSITION / PLAN   DISPOSITION: ADMIT    Jojo Gonzalez [facility]  Emergency Medicine Resident Physician, PGY-2    (Please note that portions of this note were completed with a voice recognition program.  Efforts were made to edit the dictations but occasionally words are mis-transcribed.)        [address] [facility],   Resident  02/26/20 9557

## 2020-02-27 NOTE — ED NOTES
Pt requesting food. Pt given a turkey sandwich and water. Pt repositioned. Pts wife at bedside. Pt aware he will not get a bed on the floor until tomorrow. Pt agreeable and cooperative. Pt has no further requests, will continue to monitor.       Sincere Gaitan RN  02/27/20 4268

## 2020-02-27 NOTE — PROGRESS NOTES
SW spoke to pt's wife regarding placement. SW explained process and provided a SNF list. Wife will call this worker in am with three choices. PT/OT evaluations will be needed.

## 2020-02-27 NOTE — ED NOTES
Report given to DONNA Zuniga from [place]. Report method by phone   The following was reviewed with receiving RN:   Current vital signs:  /69   Pulse 60   Temp 97.3 °F (36.3 °C) (Oral)   Resp 16   Ht 5' 10\" (1.778 m)   Wt 163 lb (73.9 kg)   SpO2 92%   BMI 23.39 kg/m²                MEWS Score: 1     Any medication or safety alerts were reviewed. Any pending diagnostics and notifications were also reviewed, as well as any safety concerns or issues, abnormal labs, abnormal imaging, and abnormal assessment findings. Questions were answered.             Adelaida Begum RN  02/27/20 2359

## 2020-02-27 NOTE — H&P
Monocytes 8 (H) 1 - 7 %    Eosinophils % 1 0 - 4 %    Basophils 1 0 - 2 %    Immature Granulocytes NOT REPORTED 0 %    Segs Absolute 9.10 1.3 - 9.1 k/uL    Absolute Lymph # 1.80 1.0 - 4.8 k/uL    Absolute Mono # 1.00 0.1 - 1.3 k/uL    Absolute Eos # 0.20 0.0 - 0.4 k/uL    Basophils Absolute 0.10 0.0 - 0.2 k/uL    Absolute Immature Granulocyte NOT REPORTED 0.00 - 0.30 k/uL    WBC Morphology NOT REPORTED     RBC Morphology NOT REPORTED     Platelet Estimate NOT REPORTED    Basic Metabolic Panel w/ Reflex to MG    Collection Time: 02/26/20  9:25 PM   Result Value Ref Range    Glucose 119 (H) 70 - 99 mg/dL    BUN 30 (H) 8 - 23 mg/dL    CREATININE 1.02 0.70 - 1.20 mg/dL    Bun/Cre Ratio NOT REPORTED 9 - 20    Calcium 10.1 8.6 - 10.4 mg/dL    Sodium 139 135 - 144 mmol/L    Potassium 4.0 3.7 - 5.3 mmol/L    Chloride 102 98 - 107 mmol/L    CO2 26 20 - 31 mmol/L    Anion Gap 11 9 - 17 mmol/L    GFR Non-African American >60 >60 mL/min    GFR African American >60 >60 mL/min    GFR Comment          GFR Staging NOT REPORTED    Brain Natriuretic Peptide    Collection Time: 02/26/20  9:25 PM   Result Value Ref Range    Pro- (H) <300 pg/mL    BNP Interpretation Pro-BNP Reference Range:    Troponin    Collection Time: 02/26/20  9:25 PM   Result Value Ref Range    Troponin, High Sensitivity 20 0 - 22 ng/L    Troponin T NOT REPORTED <0.03 ng/mL    Troponin Interp NOT REPORTED    EKG 12 Lead    Collection Time: 02/26/20  9:43 PM   Result Value Ref Range    Ventricular Rate 61 BPM    Atrial Rate 68 BPM    QRS Duration 98 ms    Q-T Interval 414 ms    QTc Calculation (Bazett) 416 ms    R Axis 36 degrees    T Axis 59 degrees       Assesment:     Primary Problem  Frequent falls    Principal Problem:    Frequent falls  Active Problems:    Essential hypertension    Hyperlipidemia    Severe obesity (BMI 35.0-39. 9) with comorbidity ([address]    Ataxia    Ambulatory dysfunction  Resolved Problems:    * No resolved hospital problems.

## 2020-02-28 PROBLEM — R41.0 CONFUSION: Status: ACTIVE | Noted: 2020-02-28

## 2020-02-28 PROCEDURE — 97162 PT EVAL MOD COMPLEX 30 MIN: CPT

## 2020-02-28 PROCEDURE — 6370000000 HC RX 637 (ALT 250 FOR IP): Performed by: EMERGENCY MEDICINE

## 2020-02-28 PROCEDURE — 97166 OT EVAL MOD COMPLEX 45 MIN: CPT

## 2020-02-28 PROCEDURE — 2580000003 HC RX 258: Performed by: EMERGENCY MEDICINE

## 2020-02-28 PROCEDURE — 97530 THERAPEUTIC ACTIVITIES: CPT

## 2020-02-28 PROCEDURE — 1200000000 HC SEMI PRIVATE

## 2020-02-28 RX ADMIN — AMLODIPINE BESYLATE 5 MG: 5 TABLET ORAL at 08:10

## 2020-02-28 RX ADMIN — BUSPIRONE HYDROCHLORIDE 5 MG: 5 TABLET ORAL at 08:10

## 2020-02-28 RX ADMIN — METOPROLOL TARTRATE 50 MG: 50 TABLET, FILM COATED ORAL at 20:25

## 2020-02-28 RX ADMIN — LOSARTAN POTASSIUM 100 MG: 50 TABLET, FILM COATED ORAL at 08:10

## 2020-02-28 RX ADMIN — ACETAMINOPHEN 1000 MG: 500 TABLET, FILM COATED ORAL at 08:11

## 2020-02-28 RX ADMIN — ASPIRIN 325 MG ORAL TABLET 162 MG: 325 PILL ORAL at 08:10

## 2020-02-28 RX ADMIN — Medication 10 ML: at 08:11

## 2020-02-28 RX ADMIN — Medication 10 ML: at 20:25

## 2020-02-28 RX ADMIN — ATORVASTATIN CALCIUM 20 MG: 20 TABLET, FILM COATED ORAL at 08:10

## 2020-02-28 RX ADMIN — HYDROCHLOROTHIAZIDE 12.5 MG: 25 TABLET ORAL at 08:11

## 2020-02-28 RX ADMIN — METOPROLOL TARTRATE 50 MG: 50 TABLET, FILM COATED ORAL at 08:11

## 2020-02-28 RX ADMIN — BUSPIRONE HYDROCHLORIDE 5 MG: 5 TABLET ORAL at 16:59

## 2020-02-28 RX ADMIN — APIXABAN 5 MG: 5 TABLET, FILM COATED ORAL at 20:25

## 2020-02-28 RX ADMIN — MULTIPLE VITAMINS W/ MINERALS TAB 1 TABLET: TAB at 08:11

## 2020-02-28 RX ADMIN — APIXABAN 5 MG: 5 TABLET, FILM COATED ORAL at 08:11

## 2020-02-28 RX ADMIN — TAMSULOSIN HYDROCHLORIDE 0.4 MG: 0.4 CAPSULE ORAL at 08:11

## 2020-02-28 ASSESSMENT — PAIN DESCRIPTION - PROGRESSION: CLINICAL_PROGRESSION: NOT CHANGED

## 2020-02-28 ASSESSMENT — ENCOUNTER SYMPTOMS
VOICE CHANGE: 0
COLOR CHANGE: 0
PHOTOPHOBIA: 0
CHOKING: 0
APNEA: 0
ANAL BLEEDING: 0
ABDOMINAL DISTENTION: 0
EYE ITCHING: 0
FACIAL SWELLING: 0
CHEST TIGHTNESS: 0
STRIDOR: 0
RECTAL PAIN: 0

## 2020-02-28 ASSESSMENT — PAIN SCALES - GENERAL
PAINLEVEL_OUTOF10: 10
PAINLEVEL_OUTOF10: 0
PAINLEVEL_OUTOF10: 10
PAINLEVEL_OUTOF10: 0

## 2020-02-28 ASSESSMENT — PAIN DESCRIPTION - ORIENTATION
ORIENTATION: LOWER
ORIENTATION: RIGHT

## 2020-02-28 ASSESSMENT — PAIN DESCRIPTION - FREQUENCY: FREQUENCY: CONTINUOUS

## 2020-02-28 ASSESSMENT — PAIN DESCRIPTION - ONSET: ONSET: ON-GOING

## 2020-02-28 ASSESSMENT — PAIN DESCRIPTION - DESCRIPTORS: DESCRIPTORS: SHARP

## 2020-02-28 ASSESSMENT — PAIN DESCRIPTION - LOCATION
LOCATION: BACK
LOCATION: HIP

## 2020-02-28 ASSESSMENT — PAIN DESCRIPTION - PAIN TYPE: TYPE: CHRONIC PAIN

## 2020-02-28 NOTE — PROGRESS NOTES
Physical Therapy    Facility/Department: [facility] MED SURG  Initial Assessment    NAME: Rocio Beckwith  : 1945  MRN: [id number]    Date of Service: 2020    Discharge Recommendations:  Patient would benefit from continued therapy after discharge   PT Equipment Recommendations  Equipment Needed: (TBD)    Assessment   Body structures, Functions, Activity limitations: Decreased functional mobility ; Decreased strength;Decreased endurance;Decreased safe awareness;Decreased balance; Increased pain  Assessment: continue per POC to maxmize potential for safe D/C, pt has hx of frequent falls at home. Currently requiring 2 assist for bed mobility and transfers  Treatment Diagnosis: impaired mobility  Specific instructions for Next Treatment: 2020 max x 2 to roll to the left,  transfer supine > sit and sit <> stand 3 attempts on the [employer] (2 minutes and 30 seconds x 2), HIGH FALL RISK   Prognosis: Fair  Decision Making: Medium Complexity  History: admitted following  a fall down steps at home  Exam: ROM, MMT, balance and mobility assessments  Clinical Presentation: max x 2 to roll to the left,  transfer supine > sit and sit <> stand 3 attempts on the [employer] (2 minutes and 30 seconds x 2), HIGH FALL RISK   PT Education: Goals;PT Role;Plan of Care  Barriers to Learning: cognition- confusion  REQUIRES PT FOLLOW UP: Yes  Activity Tolerance  Activity Tolerance: Patient limited by cognitive status       Patient Diagnosis(es): The primary encounter diagnosis was Fall, initial encounter. Diagnoses of Skin tear of hand without complication, initial encounter and AAA (abdominal aortic aneurysm) without rupture ([address] were also pertinent to this visit. has a past medical history of Arthritis, ED (erectile dysfunction), Elevated PSA, Hypercholesterolemia, Hypertension, MI (myocardial infarction) ([address] and Occult blood in stools. has a past surgical history that includes pr sono guide needle biopsy (13);  Knee arthroscopy (Left, 1981); Carotid endarterectomy (1998); Coronary artery bypass graft (1992); Hip arthroscopy (Left, 2000); Total hip arthroplasty (1990); hernia repair (1988); lumbar laminectomy (1990); Percutaneous Transluminal Coronary Angio (2004, 1984); Total knee arthroplasty (1999); joint replacement (Right, 1996); ERCP; Colonoscopy (2-25-08); Cholecystectomy (2007); and Colonoscopy (6 15 15). Restrictions  Restrictions/Precautions  Restrictions/Precautions: Fall Risk(peripheral IV right forearm)  Required Braces or Orthoses?: No  Implants present? : Metal implants(L TKA, L DENISSE, R shoulder replacement, lumbar laminectomy, sternal wires from CABG)  Position Activity Restriction  Other position/activity restrictions: up w/ assist  Vision/Hearing  Vision: Impaired(wears glasses \"whenever I feel like it\")  Hearing: Exceptions to [facility]  Hearing Exceptions: Hard of hearing/hearing concerns;Bilateral hearing aid(has B hearing aides - not with pt)     Subjective  General  Patient assessed for rehabilitation services?: Yes  Response To Previous Treatment: Not applicable  Family / Caregiver Present: No  Referring Practitioner: Dr. Sharyle Olea  Referral Date : 02/27/20  Diagnosis: frequent falls  Follows Commands: Impaired(confused)  Other (Comment): OK per nurse [facility] to proceed w/ PT evaluation  General Comment  Comments: per chart, pt fell down a flight of 2-3 steps. Pt sustained skin tear in the fall and reports increased hip pain. Subjective  Subjective: pt very restless in bed and constantly shifting position in bed.   Pt concerned why his \"wife is not at the hospital yet as she is very punctual.\"  Pain Screening  Patient Currently in Pain: Yes  Pain Assessment  Pain Assessment: 0-10  Pain Level: 10  Patient's Stated Pain Goal: 5  Pain Type: Chronic pain  Pain Location: Hip  Pain Orientation: Right  Pain Descriptors: Sharp  Pain Frequency: Continuous  Pain Onset: On-going  Clinical Progression: Not changed  Non-Pharmaceutical Pain Intervention(s): Repositioned  Response to Pain Intervention: Confused  Multiple Pain Sites: No  Vital Signs  Patient Currently in Pain: Yes       Orientation  Orientation  Overall Orientation Status: Impaired(place: [address], month: January, year: unable to state, Edgar Sotelo, birthdate: [birth year]-)  Orientation Level: Oriented to place; Disoriented to time;Disoriented to situation;Oriented to person  Social/Functional History  Social/Functional History  Lives With: Spouse  Type of Home: House  Home Layout: Two level, Able to Live on Main level with bedroom/bathroom  Home Access: Ramped entrance  Bathroom Shower/Tub: Walk-in shower, Curtain, Shower chair with back  [employer]: Handicap height  Bathroom Equipment: Grab bars in shower, Shower chair, Grab bars around toilet, Hand-held shower  Bathroom Accessibility: (3 steps to bathroom with B HR)  Home Equipment: Standard walker, Quad cane  Receives Help From: Family  ADL Assistance: Needs assistance(SUP for cues for dressing; MI with bathing and toileting))  Homemaking Assistance: Needs assistance(Spouse and daughter are primary))  Homemaking Responsibilities: No  Ambulation Assistance: Needs assistance(used quad cane at home, hx of increased falls at home)  Transfer Assistance: Needs assistance  Active : No  Mode of Transportation: SUV(pt's dtr)  Occupation: Retired  IADL Comments:  Pt's spouse provides 24/7 SUP. Pt's daughter and son provide assist as needed as well., currently wearing brief  Additional Comments: pt is a poor historian, no family present to provide information; above information taken from PT evaluation dated 11-21-19.   Cognition        Objective     Observation/Palpation  Observation: peripheral IV right forearm    AROM RLE (degrees)  RLE AROM: WFL  AROM LLE (degrees)  LLE AROM : WFL  AROM RUE (degrees)  RUE General AROM: see OT for UE assessment  AROM LUE (degrees)  LUE General AROM: see OT for OutComes Score                                                  AM-PAC Score  AM-PAC Inpatient Mobility Raw Score : 6 (02/28/20 0850)  AM-PAC Inpatient T-Scale Score : 23.55 (02/28/20 0850)  Mobility Inpatient CMS 0-100% Score: 100 (02/28/20 0850)  Mobility Inpatient CMS G-Code Modifier : CN (02/28/20 0850)          Goals  Short term goals  Time Frame for Short term goals: 5-7 treatments/ week  Short term goal 1: pt to tolerate 1/2 hour of therapuetic exercise and activity  Short term goal 2: pt to demonstrate good technique for LE strengthening exercises and balance activities by following commands accurately 75% of the time  Short term goal 3: pt to demonstrate rolling in bed and transfers supine <> sit w/ min x 2  Short term goal 4: pt to demonstrate dangling at the EOB x 20 minutes w/ min x 1 w/ good sitting balance  Short term goal 5: pt to demonstrate transfers sit <> stand on the [employer] w/ min x 2 w/ pt demonstrating knee control x 3 minutes  Short term goal 6: pt to advance to and demonstrate transfers sit <> stand and bed <> chair  w/ min x 2 using w walker when pt can demonstrate knee control on the [employer]  Short term goal 7: pt to advance to and demonstrate gait w/ w walker 20-30'   w/ min x 2 and W/C follow  Short term goal 8: pt to advance to and demonstrate fair static and dynamic balance using  w walker  w/ min x 2  Patient Goals   Patient goals : did not state a goal       Therapy Time   Individual Concurrent Group Co-treatment   Time In 0850         Time Out 0942         Minutes 52         Timed Code Treatment Minutes: [id number] [place], PT

## 2020-02-28 NOTE — PLAN OF CARE
Problem: Falls - Risk of:  Goal: Will remain free from falls  Description  Will remain free from falls  Outcome: Ongoing  Note:   No falls during this shift. Call light is within reach. Side rails up x2 with bed in lowest position. Patient safety maintained. Problem: Falls - Risk of:  Goal: Absence of physical injury  Description  Absence of physical injury  Outcome: Ongoing  Note:   No injury this shift. Patient safety maintained.

## 2020-02-28 NOTE — PROGRESS NOTES
[address]   Occupational Therapy Evaluation  Date: 20  Patient Name: Amanda Levine       Room: [facility]/5154-50  MRN: [id number]  Account: [de-identified]   : 1945  (75 y.o.) Gender: male     Discharge Recommendations:  Further Occupational  Therapy is recommended upon facility discharge. OT Equipment Recommendations  Equipment Needed: Yes  Mobility Devices: Kellie Big: Platform Bilateral, Rolling    Referring Practitioner: Dr Cherri Lazo  Diagnosis: Frequent Falls   Additional Pertinent Hx: progressive increase in confusion and increased need for assistance at home     Treatment Diagnosis: Impaired ability to care for self   Past Medical History:  has a past medical history of Arthritis, ED (erectile dysfunction), Elevated PSA, Hypercholesterolemia, Hypertension, MI (myocardial infarction) ([address] and Occult blood in stools. Past Surgical History:   has a past surgical history that includes pr sono guide needle biopsy (13); Knee arthroscopy (Left, ); Carotid endarterectomy (); Coronary artery bypass graft (); Hip arthroscopy (Left, ); Total hip arthroplasty (); hernia repair (); lumbar laminectomy (); Percutaneous Transluminal Coronary Angio (, ); Total knee arthroplasty (); joint replacement (Right, ); ERCP; Colonoscopy (08); Cholecystectomy (); and Colonoscopy (6 15 15).     Restrictions  Restrictions/Precautions: Fall Risk  Implants present? : Metal implants(L TKA, L DENISSE, R shoulder replacement, lumbar laminectomy, sternal wires from CABG)  Other position/activity restrictions: up with assist  Required Braces or Orthoses?: No     Vitals  Temp: 98 °F (36.7 °C)  Pulse: 62  Resp: 18  BP: 136/69  Height: 5' 10\" (177.8 cm)  Weight: 163 lb (73.9 kg)  BMI (Calculated): 23.4  Oxygen Therapy  SpO2: 92 %  Pulse Oximeter Device Mode: Continuous  O2 Device: None (Room air)  Level of Consciousness: Alert    Subjective  Subjective: Rouses to contact, but difficult to stay awake   Overall Orientation Status: Within Functional Limits  Vision  Vision: Impaired(wears glasses \"whenever I feel like it\")  Vision Exceptions: Wears glasses for reading  Hearing  Hearing: Exceptions to [facility]  Hearing Exceptions: Hard of hearing/hearing concerns, Bilateral hearing aid  Social/Functional History  Lives With: Spouse(Spouse has history of mild stroke - she walks with a cane  )  Type of Home: House  Home Layout: Two level, Able to Live on Main level with bedroom/bathroom(3+1 step up to bathroom addition on main level  )  Home Access: Ramped entrance  Bathroom Shower/Tub: Walk-in shower, Curtain, Shower chair with back, Tub/Shower unit  Bathroom Toilet: Handicap height  Bathroom Equipment: Grab bars in shower, Shower chair, Grab bars around toilet, Hand-held shower  Bathroom Accessibility: Not accessible(3+1 step up to bathroom addition on main level )  Home Equipment: Standard walker, Quad cane  Receives Help From: Family  ADL Assistance: Needs assistance(Cuing and recently more assist for self cares )  Homemaking Assistance: (Spouse and Daughter are primary )  Homemaking Responsibilities: No  Ambulation Assistance: Independent(REcently needs n=more cuing and assistance in walking with Quad cane )  Transfer Assistance: Needs assistance(Supervision for safety  )  Active : No  Patient's  Info: Pt's daughter does the driving   Mode of Transportation: SUV  Occupation: Retired  IADL Comments:  Pt's spouse provides 24/7 SUP. Pt's daughter and son provide assist as needed as well. Patient recently wearing briefs    Additional Comments: Patient is a poor historian - family verfied informatio regarding status prior to admission         Objective      Cognition  Overall Cognitive Status: Exceptions  Following Commands:  Follows one step commands with repetition, Follows one step commands with increased time  Attention Span: Difficulty attending to directions, Difficulty

## 2020-02-28 NOTE — FLOWSHEET NOTE
Writer spoke with patient's wife as patient slept. She shared that they were looking into a care facility to which patient can be discharged for rehab. Prayer was welcomed and appreciated. 02/28/20 1154   Encounter Summary   Services provided to: Family   Referral/Consult From: [facility] Family members   Continue Visiting   (2-28-20)   Complexity of Encounter Moderate   Length of Encounter 30 minutes   Spiritual Assessment Completed Yes   Spiritual/Scientologist   Type Spiritual support   Assessment Calm; Approachable; Hopeful   Intervention Active listening;Explored feelings, thoughts, concerns;Prayer;Scripture   Outcome Comfort;Expressed gratitude;Engaged in conversation; Hopeful   Writer provided a listening presence as patient's wife talked about her family and the support they offer. Prayer was welcomed and visit was appreciated.

## 2020-02-28 NOTE — PLAN OF CARE
Problem: Falls - Risk of:  Goal: Will remain free from falls  Description  Will remain free from falls  2/28/2020 1544 by Dale Nair RN  Outcome: Ongoing  2/28/2020 0619 by Cliff Busby RN  Outcome: Ongoing  Note:   No falls during this shift. Call light is within reach. Side rails up x2 with bed in lowest position. Patient safety maintained. Goal: Absence of physical injury  Description  Absence of physical injury  2/28/2020 1544 by Dale Nair RN  Outcome: Ongoing  2/28/2020 0619 by Cliff Busby RN  Outcome: Ongoing  Note:   No injury this shift. Patient safety maintained. Problem: Risk for Impaired Skin Integrity  Goal: Tissue integrity - skin and mucous membranes  Description  Structural intactness and normal physiological function of skin and  mucous membranes.   2/28/2020 1544 by Dale Nair RN  Outcome: Ongoing  2/28/2020 0619 by Cliff Busby RN  Outcome: Ongoing     Problem: Pain:  Goal: Pain level will decrease  Description  Pain level will decrease  Outcome: Ongoing  Goal: Control of acute pain  Description  Control of acute pain  Outcome: Ongoing  Goal: Control of chronic pain  Description  Control of chronic pain  Outcome: Ongoing     Problem: Musculor/Skeletal Functional Status  Goal: Highest potential functional level  Outcome: Ongoing  Goal: Absence of falls  Outcome: Ongoing

## 2020-02-28 NOTE — PROGRESS NOTES
BID  calcium carbonate (TUMS) chewable tablet 500 mg, BID PRN  docusate sodium (COLACE) capsule 100 mg, PRN  therapeutic multivitamin-minerals 1 tablet, Daily  tamsulosin (FLOMAX) capsule 0.4 mg, Daily  sodium chloride flush 0.9 % injection 10 mL, 2 times per day  sodium chloride flush 0.9 % injection 10 mL, PRN  melatonin ER tablet 1 mg, Nightly PRN  losartan (COZAAR) tablet 100 mg, Daily    And  hydroCHLOROthiazide (HYDRODIURIL) tablet 12.5 mg, Daily  metoprolol tartrate (LOPRESSOR) tablet 50 mg, BID  acetaminophen (TYLENOL) tablet 1,000 mg, Q8H PRN  ondansetron (ZOFRAN-ODT) disintegrating tablet 4 mg, Q8H PRN        Data:     Code Status:  Full Code    Family History   Problem Relation Age of Onset    Coronary Art Dis Father     Hypertension Father     Coronary Art Dis Mother     Hypertension Mother     Coronary Art Dis Brother     Hypertension Brother     Coronary Art Dis Sister     Hypertension Sister        Social History     Socioeconomic History    Marital status:      Spouse name: Not on file    Number of children: Not on file    Years of education: Not on file    Highest education level: Not on file   Occupational History    Not on file   Social Needs    Financial resource strain: Not on file    Food insecurity:     Worry: Not on file     Inability: Not on file    Transportation needs:     Medical: Not on file     Non-medical: Not on file   Tobacco Use    Smoking status: Former Smoker     Packs/day: 1.00     Last attempt to quit: 1983     Years since quittin.2    Smokeless tobacco: Never Used   Substance and Sexual Activity    Alcohol use: No     Alcohol/week: 0.0 standard drinks     Comment: Previously 4 drinks per month, no alcohol since 12/10/1982    Drug use: No    Sexual activity: Not on file   Lifestyle    Physical activity:     Days per week: Not on file     Minutes per session: Not on file    Stress: Not on file   Relationships    Social connections: Labs:  No results found for this or any previous visit (from the past 24 hour(s)). Physical Examination:        Vitals:  BP (!) 163/99   Pulse 86   Temp 98 °F (36.7 °C) (Axillary)   Resp 18   Ht 5' 10\" (1.778 m)   Wt 163 lb (73.9 kg)   SpO2 96%   BMI 23.39 kg/m²   Temp (24hrs), Av.9 °F (36.6 °C), Min:97.5 °F (36.4 °C), Max:98 °F (36.7 °C)    No results for input(s): POCGLU in the last 72 hours. Physical Exam  Vitals signs reviewed. Constitutional:       General: He is not in acute distress. Appearance: He is well-developed. He is not diaphoretic. HENT:      Head: Normocephalic and atraumatic. Right Ear: External ear normal.      Left Ear: External ear normal.      Nose: Nose normal.      Mouth/Throat:      Pharynx: Oropharynx is clear. Eyes:      General: Lids are normal.      Conjunctiva/sclera: Conjunctivae normal.   Neck:      Musculoskeletal: Normal range of motion and neck supple. Trachea: No tracheal deviation. Cardiovascular:      Rate and Rhythm: Normal rate and regular rhythm. Heart sounds: Normal heart sounds, S1 normal and S2 normal.   Pulmonary:      Effort: Pulmonary effort is normal. No respiratory distress. Breath sounds: Normal breath sounds. No decreased breath sounds, wheezing or rhonchi. Abdominal:      General: Bowel sounds are normal. There is no distension. Palpations: Abdomen is soft. Tenderness: There is no abdominal tenderness. Musculoskeletal:         General: No tenderness or deformity. Lymphadenopathy:      Cervical: No cervical adenopathy. Skin:     General: Skin is warm and dry. Capillary Refill: Capillary refill takes less than 2 seconds. Nails: There is no clubbing. Neurological:      Mental Status: He is alert. Mental status is at baseline. He is disoriented. Motor: No abnormal muscle tone or seizure activity.       Coordination: Coordination normal.      Gait: Gait abnormal.   Psychiatric: Mood and Affect: Mood is anxious. Assessment:        Primary Problem  Frequent falls     Principal Problem:    Frequent falls  Active Problems:    Essential hypertension    Hyperlipidemia    Severe obesity (BMI 35.0-39. 9) with comorbidity ([address]    Ataxia    Ambulatory dysfunction    Hallucination, drug-induced ([address]    Confusion  Resolved Problems:    * No resolved hospital problems. *      Past Medical History:   Diagnosis Date    Arthritis     ED (erectile dysfunction)     Elevated PSA     Hypercholesterolemia     Hypertension     MI (myocardial infarction) ([address]     Occult blood in stools 10/19/2015        Plan:        1. Consult neurology  2. PPI  3. DVT Prophylaxis  4. EPCs  5. PT/OT to evaluate and treat  6.  check and replace electrolytes per sliding scale  7.  Discussed with patient's wife in detail about discharge planning patient will need ECF      Electronically signed by Shiloh Cornelius MD

## 2020-02-28 NOTE — DISCHARGE INSTR - COC
Continuity of Care Form    Patient Name: Stephanie Castillo   :  1945  MRN:  [id number]    Admit date:  2020  Discharge date:  ***    Code Status Order: Full Code   Advance Directives:   Advance Care Flowsheet Documentation     Date/Time Healthcare Directive Type of Healthcare Directive Copy in [address] Agent's Name Healthcare Agent's Phone Number    [phone]  Yes, patient has an advance directive for healthcare treatment  Durable power of  for health care  Yes, copy in chart  --  --  --          Admitting Physician:  Bernabe Sevilla MD  PCP: Bernabe Sevilla MD    Discharging Nurse: [facility] Unit/Room#: 4195/5998-52  Discharging Unit Phone Number: ***    Emergency Contact:   Extended Emergency Contact Information  Primary Emergency Contact: Zohra Sesay  Address: [address] [place] [address], [address] Phone: [phone]  Work Phone: [phone]  Mobile Phone: [phone]  Relation: Spouse   needed?  No  [facility] Phone: [phone]  Mobile Phone: [phone]  Relation: Child  Preferred language: English    Past Surgical History:  Past Surgical History:   Procedure Laterality Date    CAROTID ENDARTERECTOMY  1998    bilateral    CHOLECYSTECTOMY      COLONOSCOPY  08    COLONOSCOPY  6 15 15    polyp,bx    CORONARY ARTERY BYPASS GRAFT      3 vessel    ERCP      with stent    HERNIA REPAIR      inguinal    HIP ARTHROSCOPY Left 2000    JOINT REPLACEMENT Right 1996    shoulder    KNEE ARTHROSCOPY Left     LUMBAR LAMINECTOMY      TN SONO GUIDE NEEDLE BIOPSY  13    PTCA  ,     TOTAL HIP ARTHROPLASTY      TOTAL KNEE ARTHROPLASTY      left       Immunization History:   Immunization History   Administered Date(s) Administered    Influenza Vaccine, unspecified formulation 2010, 2012, 10/02/2013, 09/29/2014, 11/17/2015, 10/30/2016    Influenza Virus Vaccine 09/07/2017    Influenza, Trivalent Vaccine 6-35 Months, IM, Preservative Free 01/01/2010, 09/01/2012, 10/02/2013, 09/29/2014, 11/17/2015, 10/30/2016, 09/07/2017    Pneumococcal Conjugate 13-valent (Olopccv75) 08/15/2017    Pneumococcal Polysaccharide ([id number]) 10/02/2010    Tdap (Boostrix, Adacel) 02/26/2020       Active Problems:  Patient Active Problem List   Diagnosis Code    Fracture of humeral head, closed, right, initial encounter S42.291A    Closed right humeral fracture S42.301A    Fracture of humeral head, closed, right, closed, initial encounter S42.291A    History of carotid artery disease s/p right carotid endarterectomy Z86.79    Coronary artery disease involving native heart without angina pectoris s/p CABG I25.10    Chronic a-fib I48.20    Essential hypertension I10    Pulmonary nodule R91.1    Nondisplaced oblique fracture of shaft of right humerus with routine healing S42.334D    Chest pain R07.9    Benign prostatic hyperplasia with urinary frequency N40.1, R35.0    Hyperlipidemia E78.5    Impaired fasting glucose R73.01    Insomnia G47.00    Memory changes R41.3    Orthostatic hypotension I95.1    Osteoarthrosis M19.90    Severe obesity (BMI 35.0-39. 9) with comorbidity (HCC) E66.01    Elevated PSA R97.20    Confusion and disorientation R41.0    Anxiety F41.9    Frequent falls R29.6    Ataxia R27.0    Ambulatory dysfunction R26.2    Hallucination, drug-induced (HCC) F19.951    Confusion R41.0       Isolation/Infection:   Isolation          No Isolation        Patient Infection Status     None to display          Nurse Assessment:  Last Vital Signs: BP (!) 163/99   Pulse 86   Temp 98 °F (36.7 °C) (Axillary)   Resp 18   Ht 5' 10\" (1.778 m)   Wt 163 lb (73.9 kg)   SpO2 96%   BMI 23.39 kg/m²     Last documented pain score (0-10 scale): Pain Level: 10  Last Weight:   Wt Readings from Last 1 Encounters:

## 2020-02-29 PROCEDURE — 97530 THERAPEUTIC ACTIVITIES: CPT

## 2020-02-29 PROCEDURE — 97110 THERAPEUTIC EXERCISES: CPT

## 2020-02-29 PROCEDURE — 1200000000 HC SEMI PRIVATE

## 2020-02-29 PROCEDURE — 6370000000 HC RX 637 (ALT 250 FOR IP): Performed by: EMERGENCY MEDICINE

## 2020-02-29 PROCEDURE — 51798 US URINE CAPACITY MEASURE: CPT

## 2020-02-29 PROCEDURE — 2580000003 HC RX 258: Performed by: EMERGENCY MEDICINE

## 2020-02-29 RX ADMIN — AMLODIPINE BESYLATE 5 MG: 5 TABLET ORAL at 08:58

## 2020-02-29 RX ADMIN — LOSARTAN POTASSIUM 100 MG: 50 TABLET, FILM COATED ORAL at 08:57

## 2020-02-29 RX ADMIN — ASPIRIN 325 MG ORAL TABLET 162 MG: 325 PILL ORAL at 08:57

## 2020-02-29 RX ADMIN — BUSPIRONE HYDROCHLORIDE 5 MG: 5 TABLET ORAL at 08:57

## 2020-02-29 RX ADMIN — APIXABAN 5 MG: 5 TABLET, FILM COATED ORAL at 08:57

## 2020-02-29 RX ADMIN — ACETAMINOPHEN 1000 MG: 500 TABLET, FILM COATED ORAL at 10:46

## 2020-02-29 RX ADMIN — METOPROLOL TARTRATE 50 MG: 50 TABLET, FILM COATED ORAL at 08:56

## 2020-02-29 RX ADMIN — MULTIPLE VITAMINS W/ MINERALS TAB 1 TABLET: TAB at 08:58

## 2020-02-29 RX ADMIN — APIXABAN 5 MG: 5 TABLET, FILM COATED ORAL at 20:56

## 2020-02-29 RX ADMIN — Medication 1 MG: at 20:56

## 2020-02-29 RX ADMIN — HYDROCHLOROTHIAZIDE 12.5 MG: 25 TABLET ORAL at 08:56

## 2020-02-29 RX ADMIN — Medication 10 ML: at 09:05

## 2020-02-29 RX ADMIN — ATORVASTATIN CALCIUM 20 MG: 20 TABLET, FILM COATED ORAL at 09:04

## 2020-02-29 RX ADMIN — BUSPIRONE HYDROCHLORIDE 5 MG: 5 TABLET ORAL at 16:55

## 2020-02-29 RX ADMIN — TAMSULOSIN HYDROCHLORIDE 0.4 MG: 0.4 CAPSULE ORAL at 08:57

## 2020-02-29 ASSESSMENT — ENCOUNTER SYMPTOMS
STRIDOR: 0
ABDOMINAL DISTENTION: 0
APNEA: 0
FACIAL SWELLING: 0
ANAL BLEEDING: 0
COLOR CHANGE: 0
VOICE CHANGE: 0
CHOKING: 0
RECTAL PAIN: 0
CHEST TIGHTNESS: 0
PHOTOPHOBIA: 0
EYE ITCHING: 0

## 2020-02-29 ASSESSMENT — PAIN DESCRIPTION - LOCATION: LOCATION: WRIST

## 2020-02-29 ASSESSMENT — PAIN SCALES - GENERAL
PAINLEVEL_OUTOF10: 3
PAINLEVEL_OUTOF10: 2

## 2020-02-29 ASSESSMENT — PAIN DESCRIPTION - PAIN TYPE: TYPE: ACUTE PAIN

## 2020-02-29 ASSESSMENT — PAIN DESCRIPTION - ORIENTATION: ORIENTATION: RIGHT

## 2020-02-29 ASSESSMENT — PAIN DESCRIPTION - DESCRIPTORS: DESCRIPTORS: ACHING

## 2020-02-29 ASSESSMENT — PAIN DESCRIPTION - FREQUENCY: FREQUENCY: INTERMITTENT

## 2020-02-29 NOTE — PROGRESS NOTES
Relationships    Social connections:     Talks on phone: Not on file     Gets together: Not on file     Attends Sabianist service: Not on file     Active member of club or organization: Not on file     Attends meetings of clubs or organizations: Not on file     Relationship status: Not on file    Intimate partner violence:     Fear of current or ex partner: Not on file     Emotionally abused: Not on file     Physically abused: Not on file     Forced sexual activity: Not on file   Other Topics Concern    Not on file   Social History Narrative    Not on file       I/O (24Hr): Intake/Output Summary (Last 24 hours) at 2/29/2020 1546  Last data filed at 2/29/2020 1515  Gross per 24 hour   Intake --   Output 1350 ml   Net -1350 ml     Radiology:  Xr Chest (single View Frontal)    Result Date: 2/26/2020  1. Focal subsegmental atelectasis or scar lateral lower left lung. 2. Calcific atherosclerosis aorta. 3. Cardiomegaly. 4. Left glenohumeral joint osteoarthritis. If clinically there is concern of underlying rib fracture, sternal or other acute traumatic abnormality of the chest, then additional evaluation with dedicated imaging of the area of interest versus CT chest should be considered. Ct Head Wo Contrast    Result Date: 2/26/2020  Limited exam due to motion Multiple areas of encephalomalacia with surrounding gliotic change Multiple old infarcts Multifocal small-vessel ischemic change No acute hemorrhage     Ct Lumbar Spine Wo Contrast    Result Date: 2/26/2020  No acute fracture Multilevel degenerative disc disease as described. See above for details of each level. If more detail is desired, consider MRI Aortic aneurysm. RECOMMENDATIONS: For management of fusiform AAA: * For management of saccular abdominal aortic aneurysms of any size, recommend vascular consultation. Note:  For AAA enlargement of >0.5 cm in 6 months or >1 cm in 1 year, recommend vascular consultation.  References: Josef Sauer Radiol 2013; 21(33):657-356; J Vasc Surg. 2018; 67:2-77       Labs:  No results found for this or any previous visit (from the past 24 hour(s)). Physical Examination:        Vitals:  /70   Pulse 58   Temp 97.3 °F (36.3 °C) (Axillary)   Resp 18   Ht 5' 10\" (1.778 m)   Wt 163 lb (73.9 kg)   SpO2 99%   BMI 23.39 kg/m²   Temp (24hrs), Av.5 °F (36.4 °C), Min:97.3 °F (36.3 °C), Max:97.8 °F (36.6 °C)    No results for input(s): POCGLU in the last 72 hours. Physical Exam  Vitals signs reviewed. Constitutional:       General: He is not in acute distress. Appearance: He is well-developed. He is not diaphoretic. HENT:      Head: Normocephalic and atraumatic. Right Ear: External ear normal.      Left Ear: External ear normal.      Nose: Nose normal.      Mouth/Throat:      Pharynx: Oropharynx is clear. Eyes:      General: Lids are normal.      Conjunctiva/sclera: Conjunctivae normal.   Neck:      Musculoskeletal: Normal range of motion and neck supple. Trachea: No tracheal deviation. Cardiovascular:      Rate and Rhythm: Normal rate and regular rhythm. Heart sounds: Normal heart sounds, S1 normal and S2 normal.   Pulmonary:      Effort: Pulmonary effort is normal. No respiratory distress. Breath sounds: Normal breath sounds. No decreased breath sounds, wheezing or rhonchi. Abdominal:      General: Bowel sounds are normal. There is no distension. Palpations: Abdomen is soft. Tenderness: There is no abdominal tenderness. Musculoskeletal:         General: No tenderness or deformity. Lymphadenopathy:      Cervical: No cervical adenopathy. Skin:     General: Skin is warm and dry. Capillary Refill: Capillary refill takes less than 2 seconds. Nails: There is no clubbing. Neurological:      Mental Status: He is alert. Mental status is at baseline. He is disoriented. Motor: No abnormal muscle tone or seizure activity.       Coordination: Coordination normal. Gait: Gait abnormal.   Psychiatric:         Mood and Affect: Mood is anxious. Assessment:        Primary Problem  Frequent falls     Principal Problem:    Frequent falls  Active Problems:    Essential hypertension    Hyperlipidemia    Severe obesity (BMI 35.0-39. 9) with comorbidity ([address]    Ataxia    Ambulatory dysfunction    Hallucination, drug-induced ([address]    Confusion  Resolved Problems:    * No resolved hospital problems. *      Past Medical History:   Diagnosis Date    Arthritis     ED (erectile dysfunction)     Elevated PSA     Hypercholesterolemia     Hypertension     [facility]) [facility]     Occult blood in stools 10/19/2015        Plan:        1. Consult neurology  2. PPI  3. DVT Prophylaxis  4. EPCs  5. PT/OT to evaluate and treat  6.  check and replace electrolytes per sliding scale  7.  Discussed with patient's wife in detail about discharge planning patient will need ECF      2/29   Falls    due to multi infarct dementia   ataxia  And djd   non syncopal meds revieved  Urinary retention   chck post void residuals   H/o elevated psa  Recheck psa   Cr nl  Ct neg   ua neg  Wbc 12  Follow   Electronically signed by Yahaira Kaufman MD

## 2020-02-29 NOTE — PROGRESS NOTES
Physical Therapy  [address] [facility]    Physical Therapy Progress Note    Date: 20  Patient Name: Camelia Jarquin       Room: 6268/4447-71  MRN: [id number]   Account: [de-identified]   : 1945  (75 y.o.)   Gender: male     Discharge Recommendations   Patient would benefit from continued therapy after discharge  Equipment Needed: (TBD)    Referring Practitioner: Dr Miranda Rich  Diagnosis: Frequent Falls   Restrictions/Precautions: Fall Risk(peripheral IV right forearm)  Implants present? : Metal implants(L TKA, L DENISSE, R shoulder replacement, lumbar laminectomy, sternal wires from CABG)  Other position/activity restrictions: up w/ assist   Past Medical History:  has a past medical history of Arthritis, ED (erectile dysfunction), Elevated PSA, Hypercholesterolemia, Hypertension, MI (myocardial infarction) ([address] and Occult blood in stools. Past Surgical History:   has a past surgical history that includes pr sono guide needle biopsy (13); Knee arthroscopy (Left, ); Carotid endarterectomy (); Coronary artery bypass graft (); Hip arthroscopy (Left, ); Total hip arthroplasty (); hernia repair (); lumbar laminectomy (); Percutaneous Transluminal Coronary Angio (, ); Total knee arthroplasty (); joint replacement (Right, ); ERCP; Colonoscopy (08); Cholecystectomy (); and Colonoscopy (6 15 15).        Overall Orientation Status: Impaired(place: 1 [facility], month: January, year: unable to state, Shayla Brath, birthdate: [birth year]  Orientation Level: Oriented to place, Disoriented to time, Disoriented to situation, Oriented to person  Restrictions/Precautions  Restrictions/Precautions: Fall Risk(peripheral IV right forearm)  Required Braces or Orthoses?: No  Implants present? : Metal implants(L TKA, L DENISSE, R shoulder replacement, lumbar laminectomy, sternal wires from CABG)  Position Activity Restriction  Other position/activity restrictions: up w/

## 2020-02-29 NOTE — CARE COORDINATION
DISCHARGE PLANNING NOTE:    LSW following for discharge to SNF-[facility] with pre-cert started 6/51. Admitted with frequent falls, hx dementia. PT/OT rec SNF. Will continue to follow for additional d/c needs.     Electronically signed by Robyne Cushing, RN on 2/29/2020 at 8:23 AM

## 2020-02-29 NOTE — CONSULTS
[address] [facility]                 [address] [facility], [address]                                  CONSULTATION    PATIENT NAME: Sheba Mena                    :        1945  MED REC NO:   [id number]                              ROOM:       2044  ACCOUNT NO:   [de-identified]                           ADMIT DATE: 2020  PROVIDER:     Pam Almaguer    NEUROLOGY CONSULTATION    CONSULT DATE:  2020    REFERRING PROVIDER:  Denice Juan MD    HISTORY OF PRESENT ILLNESS:  Thank you for asking us to see this man for  a neurologic evaluation. He is 66years old and we are asked to see him  regarding mental change. The wife tells me that the patient has increasingly become too difficult  for her to manage at home and there is a plan for nursing home  placement. The nurse tells me the same thing. He is brought in because  he has increasing confusion, although this is not a new issue. He is  being worked up as an outpatient by Dr. Perico Lyon. Testing has  already been done. He also has apparently seen Dr. Candelaria Costello, although the  details of the outpatient workup are not available to me at the time of  this dictation. There is no new lateralized numbness or weakness. His  CT of the brain shows multiinfarct state, but nothing acute. The  patient's wife tells me he is \"much better\" this morning. He had some  agitation yesterday, but this has resolved. PAST MEDICAL HISTORY:  His past medical history from a neurologic  standpoint is largely as mentioned above. He has a history of cardiac  arrest, hypertension, elevated serum lipids, multiple orthopedic  procedures, lumbar laminectomy, hernia repair, cholecystectomy, and  carotid endarterectomy. FAMILY HISTORY:  There is no family history of stroke or epilepsy. SOCIAL HISTORY:  He smoked previously, but not recently. He does not  drink alcohol.     CURRENT MEDICATIONS:  Noted in the computer record including Eliquis. REVIEW OF SYSTEMS:  With the patient and his wife along with the  computer record other than what is mentioned above, review of systems is  negative for HEENT, cardiovascular, pulmonary, gastrointestinal,  urinary, musculoskeletal, skin, endocrine, immunologic, and psychiatric. PHYSICAL EXAMINATION:  On examination, he is sitting up in the chair. He is smiling and pleasant. He is alert. I do not see any sign of head  or tongue injury. He has symmetric full motor movements, but does not  really give good effort with formal motor or sensory testing. He does  not seem to have anything focal.  1+ deep tendon reflexes throughout. Coordination appears intact. No tremor, gross ataxia, or nystagmus. He  knows he is in the hospital.  He cannot tell me the month or the year. He did know the President's name. He was calm and cooperative. IMPRESSION:  Per the history available to me, it seems like we are  having progression of known dementia. This has already been worked up  as an outpatient. CT of the brain demonstrates multiinfarct state and  vascular dementia is suspected. The patient is already on Eliquis. I  do not think he needs any further inpatient neurologic testing. I  understand that placement is underway.         Aurora Coats    D: 02/29/2020 11:26:09       T: 02/29/2020 11:37:44     [email or web address]  Job#: [id number]     Doc#: [id number]    CC:  LAN Bass

## 2020-03-01 PROCEDURE — 6370000000 HC RX 637 (ALT 250 FOR IP): Performed by: INTERNAL MEDICINE

## 2020-03-01 PROCEDURE — 2580000003 HC RX 258: Performed by: EMERGENCY MEDICINE

## 2020-03-01 PROCEDURE — 97530 THERAPEUTIC ACTIVITIES: CPT

## 2020-03-01 PROCEDURE — 1200000000 HC SEMI PRIVATE

## 2020-03-01 PROCEDURE — 6370000000 HC RX 637 (ALT 250 FOR IP): Performed by: EMERGENCY MEDICINE

## 2020-03-01 PROCEDURE — 97110 THERAPEUTIC EXERCISES: CPT

## 2020-03-01 RX ORDER — BUSPIRONE HYDROCHLORIDE 5 MG/1
10 TABLET ORAL 2 TIMES DAILY
Status: DISCONTINUED | OUTPATIENT
Start: 2020-03-01 | End: 2020-03-03 | Stop reason: HOSPADM

## 2020-03-01 RX ADMIN — MULTIPLE VITAMINS W/ MINERALS TAB 1 TABLET: TAB at 08:32

## 2020-03-01 RX ADMIN — LOSARTAN POTASSIUM 100 MG: 50 TABLET, FILM COATED ORAL at 08:31

## 2020-03-01 RX ADMIN — ASPIRIN 325 MG ORAL TABLET 162 MG: 325 PILL ORAL at 08:32

## 2020-03-01 RX ADMIN — APIXABAN 5 MG: 5 TABLET, FILM COATED ORAL at 08:32

## 2020-03-01 RX ADMIN — Medication 1 MG: at 21:10

## 2020-03-01 RX ADMIN — METOPROLOL TARTRATE 50 MG: 50 TABLET, FILM COATED ORAL at 21:10

## 2020-03-01 RX ADMIN — BUSPIRONE HYDROCHLORIDE 5 MG: 5 TABLET ORAL at 08:32

## 2020-03-01 RX ADMIN — METOPROLOL TARTRATE 50 MG: 50 TABLET, FILM COATED ORAL at 08:31

## 2020-03-01 RX ADMIN — APIXABAN 5 MG: 5 TABLET, FILM COATED ORAL at 21:10

## 2020-03-01 RX ADMIN — AMLODIPINE BESYLATE 5 MG: 5 TABLET ORAL at 08:32

## 2020-03-01 RX ADMIN — HYDROCHLOROTHIAZIDE 12.5 MG: 25 TABLET ORAL at 08:32

## 2020-03-01 RX ADMIN — TAMSULOSIN HYDROCHLORIDE 0.4 MG: 0.4 CAPSULE ORAL at 08:32

## 2020-03-01 RX ADMIN — ATORVASTATIN CALCIUM 20 MG: 20 TABLET, FILM COATED ORAL at 08:32

## 2020-03-01 RX ADMIN — Medication 10 ML: at 21:11

## 2020-03-01 RX ADMIN — BUSPIRONE HYDROCHLORIDE 10 MG: 5 TABLET ORAL at 17:26

## 2020-03-01 RX ADMIN — ACETAMINOPHEN 1000 MG: 500 TABLET, FILM COATED ORAL at 21:10

## 2020-03-01 ASSESSMENT — ENCOUNTER SYMPTOMS
STRIDOR: 0
PHOTOPHOBIA: 0
RECTAL PAIN: 0
COLOR CHANGE: 0
FACIAL SWELLING: 0
CHEST TIGHTNESS: 0
VOICE CHANGE: 0
ABDOMINAL DISTENTION: 0
ANAL BLEEDING: 0
EYE ITCHING: 0
CHOKING: 0
APNEA: 0

## 2020-03-01 ASSESSMENT — PAIN SCALES - GENERAL
PAINLEVEL_OUTOF10: 0
PAINLEVEL_OUTOF10: 5

## 2020-03-01 ASSESSMENT — PAIN DESCRIPTION - PROGRESSION: CLINICAL_PROGRESSION: NOT CHANGED

## 2020-03-01 ASSESSMENT — PAIN DESCRIPTION - LOCATION: LOCATION: HIP

## 2020-03-01 ASSESSMENT — PAIN DESCRIPTION - DESCRIPTORS: DESCRIPTORS: ACHING;DULL;CONSTANT

## 2020-03-01 ASSESSMENT — PAIN DESCRIPTION - ONSET: ONSET: AWAKENED FROM SLEEP

## 2020-03-01 ASSESSMENT — PAIN DESCRIPTION - FREQUENCY: FREQUENCY: CONTINUOUS

## 2020-03-01 ASSESSMENT — PAIN DESCRIPTION - PAIN TYPE: TYPE: CHRONIC PAIN

## 2020-03-01 ASSESSMENT — PAIN DESCRIPTION - ORIENTATION: ORIENTATION: RIGHT

## 2020-03-01 NOTE — PROGRESS NOTES
Physical Therapy  Facility/Department: [facility] MED SURG  Daily Treatment Note  NAME: Ethan Crews  : 1945  MRN: [id number]    Date of Service: 3/1/2020    Discharge Recommendations:  Patient would benefit from continued therapy after discharge   PT Equipment Recommendations  Equipment Needed: (TBD)    Assessment   Body structures, Functions, Activity limitations: Decreased functional mobility ; Decreased strength;Decreased endurance;Decreased safe awareness;Decreased balance; Increased pain  Assessment: continue per POC to maxmize potential for safe D/C, pt has hx of frequent falls at home. Currently requiring 2 assist for bed mobility and transfers  Treatment Diagnosis: impaired mobility  Specific instructions for Next Treatment: 3-1-2020 mod x 2 to roll to the left,  Max x 2  transfer supine > sit , mod x 2  sit <> stand 3 attempts on the [employer] (1 1/2  minutes and 1 minute twice), HIGH FALL RISK , completed seated bilateral UE and LE exercises w/ strong verbal cues  Prognosis: Fair  Decision Making: Medium Complexity  History: admitted following  a fall down steps at home  Exam: ROM, MMT, balance and mobility assessments  Clinical Presentation: mod x 2 to roll to the left,  Max x 2  transfer supine > sit , mod x 2  sit <> stand 3 attempts on the [employer] (1 1/2  minutes and 1 minute twice), HIGH FALL RISK , completed seated bilateral UE and LE exercises w/ strong verbal cues  PT Education: Goals;PT Role;Plan of Care  Barriers to Learning: cognition- confusion  REQUIRES PT FOLLOW UP: Yes  Activity Tolerance  Activity Tolerance: Patient limited by fatigue;Patient limited by endurance; Patient limited by cognitive status     Patient Diagnosis(es): The primary encounter diagnosis was Fall, initial encounter. Diagnoses of Skin tear of hand without complication, initial encounter and AAA (abdominal aortic aneurysm) without rupture ([facility] [address] were also pertinent to this visit.      has a past medical history of Arthritis, ED (erectile dysfunction), Elevated PSA, Hypercholesterolemia, Hypertension, MI (myocardial infarction) ([facility] [address] and Occult blood in stools. has a past surgical history that includes pr sono guide needle biopsy (1/8/13); Knee arthroscopy (Left, 1981); Carotid endarterectomy (1998); Coronary artery bypass graft (1992); Hip arthroscopy (Left, 2000); Total hip arthroplasty (1990); hernia repair (1988); lumbar laminectomy (1990); Percutaneous Transluminal Coronary Angio (2004, 1984); Total knee arthroplasty (1999); joint replacement (Right, 1996); ERCP; Colonoscopy (2-25-08); Cholecystectomy (2007); and Colonoscopy (6 15 15). Restrictions  Restrictions/Precautions  Restrictions/Precautions: Fall Risk(peripheral IV right forearm)  Required Braces or Orthoses?: No  Implants present? : Metal implants(L TKA, L DENISSE, R shoulder replacement, lumbar laminectomy, sternal wires from CABG)  Position Activity Restriction  Other position/activity restrictions: up w/ assist  Subjective   General  Response To Previous Treatment: Patient with no complaints from previous session. Family / Caregiver Present: Yes(spouse)  Referring Practitioner: Dr. Oksana Norman: pt very restless in the chair  and constantly shifting position. General Comment  Comments: per chart, pt fell down a flight of 2-3 steps. Pt sustained skin tear in the fall and reports increased hip pain. Pain Screening  Patient Currently in Pain: Denies  Vital Signs  Patient Currently in Pain: Denies       Orientation  Orientation  Overall Orientation Status: Impaired(place: [address] [place] [address], month: January, year: unable to state, Juan Graves, birthdate: [birth year]-)  Orientation Level: Oriented to place; Disoriented to time;Disoriented to situation;Oriented to person  Cognition      Objective   Bed mobility  Rolling to Left: 2 Person assistance; Moderate assistance  Supine to Sit: Maximum assistance;2 Person assistance  Scooting: Maximal assistance;2 Person assistance  Comment: dangled at the EOB x 5 minutes  w/ CGA x 1- fall risk- pt can be impulsive  Transfers  Sit to Stand: 2 Person Assistance; Moderate Assistance  Stand to sit: 2 Person Assistance; Moderate Assistance  Comment: stood 3 x on the Cecily [employer]land w/ pt standing 1 1/2  minutes first attempt and 1 minute  2nd and 3rd attempts, used Cecily [employer] to place Pt in bedside chair- can be very impulsive- FALL RISK  Ambulation  Ambulation?: No(not ready yet, mod x x 2 assist to stand on [employer])  Stairs/Curb  Stairs?: No     Balance  Sitting - Static: Fair  Sitting - Dynamic: Fair;-  Standing - Static: Poor;+(stood on [employer])  Standing - Dynamic: Poor;+(stood on [employer])  Exercises  Comments: LE seated exercises x 15 reps w/ strong verbal and tactile cuing for technique, attempted 1 # cane for bilateral UE flexion to 80 degrees and bicep curls- needs strong verbal cuing                        G-Code     OutComes Score                                                     -PAC Score  AM-PAC Inpatient Mobility Raw Score : 6 (02/28/20 0850)  -PAC Inpatient T-Scale Score : 23.55 (02/28/20 0850)  Mobility Inpatient CMS 0-100% Score: 100 (02/28/20 0850)  Mobility Inpatient CMS G-Code Modifier : CN (02/28/20 0850)          Goals  Short term goals  Time Frame for Short term goals: 5-7 treatments/ week  Short term goal 1: pt to tolerate 1/2 hour of therapuetic exercise and activity  Short term goal 2: pt to demonstrate good technique for LE strengthening exercises and balance activities by following commands accurately 75% of the time  Short term goal 3: pt to demonstrate rolling in bed and transfers supine <> sit w/ min x 2  Short term goal 4: pt to demonstrate dangling at the EOB x 20 minutes w/ min x 1 w/ good sitting balance  Short term goal 5: pt to demonstrate transfers sit <> stand on the [employer] w/ min x 2 w/ pt demonstrating knee control x 3 minutes  Short term goal 6: pt to advance to

## 2020-03-01 NOTE — PROGRESS NOTES
Coordination: Coordination normal.      Gait: Gait abnormal.   Psychiatric:         Mood and Affect: Mood is anxious. Assessment:        Primary Problem  Frequent falls     Principal Problem:    Frequent falls  Active Problems:    Essential hypertension    Hyperlipidemia    Severe obesity (BMI 35.0-39. 9) with comorbidity ([facility] [address]    Ataxia    Ambulatory dysfunction    Hallucination, drug-induced ([facility] [address]    Confusion  Resolved Problems:    * No resolved hospital problems. *      Past Medical History:   Diagnosis Date    Arthritis     ED (erectile dysfunction)     Elevated PSA     Hypercholesterolemia     Hypertension     MI (myocardial infarction) ([facility] [address]     Occult blood in stools 10/19/2015        Plan:        1. Consult neurology  2. PPI  3. DVT Prophylaxis  4. EPCs  5. PT/OT to evaluate and treat  6.  check and replace electrolytes per sliding scale  7. Discussed with patient's wife in detail about discharge planning patient will need ECF      2/29   Falls    due to multi infarct dementia   ataxia  And djd   non syncopal meds revieved  Urinary retention   chck post void residuals   H/o elevated psa  Recheck psa   Cr nl  Ct neg   ua neg  Wbc 12  Follow   3/1  Dementia related diliriun meds revieved   Anxiety component      Neurology eval noted     fall precautions      increase buspar  On eleiquis   hctx ? ?     bp 120/80   Electronically signed by Franko Roper MD

## 2020-03-01 NOTE — PLAN OF CARE
Problem: Falls - Risk of:  Goal: Will remain free from falls  Description  Will remain free from falls  Outcome: Met This Shift  Goal: Absence of physical injury  Description  Absence of physical injury  Outcome: Met This Shift     Problem: Risk for Impaired Skin Integrity  Goal: Tissue integrity - skin and mucous membranes  Description  Structural intactness and normal physiological function of skin and  mucous membranes. Outcome: Met This Shift     Problem: Pain:  Goal: Pain level will decrease  Description  Pain level will decrease  Outcome: Met This Shift  Goal: Control of acute pain  Description  Control of acute pain  Outcome: Met This Shift  Goal: Control of chronic pain  Description  Control of chronic pain  Outcome: Met This Shift     Problem: Musculor/Skeletal Functional Status  Goal: Highest potential functional level  Outcome: Met This Shift  Goal: Absence of falls  Outcome: Met This Shift  Goal: Highest potential functional level  Outcome: Met This Shift  Goal: Absence of falls  Outcome: Met This Shift   Pt stable. No distress. Educated on staff, safety, environment, routine and POC this shift. Safety maintained. Call light reachable.  Electronically signed by Delmi Theodore RN on 3/1/2020 at 5:10 AM

## 2020-03-01 NOTE — CARE COORDINATION
DISCHARGE PLANNING NOTE:     LSW following for discharge to SNF-[facility] with pre-cert started 6/36.     Admitted with frequent falls, hx dementia.     PT/OT rec SNF.     Will continue to follow for additional d/c needs.     Electronically signed by Mallory Dalal RN on 3/1/2020 at 8:14 AM

## 2020-03-02 PROCEDURE — 97535 SELF CARE MNGMENT TRAINING: CPT

## 2020-03-02 PROCEDURE — 1200000000 HC SEMI PRIVATE

## 2020-03-02 PROCEDURE — 97530 THERAPEUTIC ACTIVITIES: CPT

## 2020-03-02 PROCEDURE — 2580000003 HC RX 258: Performed by: EMERGENCY MEDICINE

## 2020-03-02 PROCEDURE — 6370000000 HC RX 637 (ALT 250 FOR IP): Performed by: EMERGENCY MEDICINE

## 2020-03-02 PROCEDURE — 6370000000 HC RX 637 (ALT 250 FOR IP): Performed by: INTERNAL MEDICINE

## 2020-03-02 RX ADMIN — BUSPIRONE HYDROCHLORIDE 10 MG: 5 TABLET ORAL at 10:03

## 2020-03-02 RX ADMIN — METOPROLOL TARTRATE 50 MG: 50 TABLET, FILM COATED ORAL at 19:58

## 2020-03-02 RX ADMIN — BUSPIRONE HYDROCHLORIDE 10 MG: 5 TABLET ORAL at 17:15

## 2020-03-02 RX ADMIN — ASPIRIN 325 MG ORAL TABLET 162 MG: 325 PILL ORAL at 10:04

## 2020-03-02 RX ADMIN — METOPROLOL TARTRATE 50 MG: 50 TABLET, FILM COATED ORAL at 10:04

## 2020-03-02 RX ADMIN — APIXABAN 5 MG: 5 TABLET, FILM COATED ORAL at 10:04

## 2020-03-02 RX ADMIN — MULTIPLE VITAMINS W/ MINERALS TAB 1 TABLET: TAB at 10:03

## 2020-03-02 RX ADMIN — AMLODIPINE BESYLATE 5 MG: 5 TABLET ORAL at 10:04

## 2020-03-02 RX ADMIN — ATORVASTATIN CALCIUM 20 MG: 20 TABLET, FILM COATED ORAL at 10:04

## 2020-03-02 RX ADMIN — Medication 10 ML: at 19:59

## 2020-03-02 RX ADMIN — APIXABAN 5 MG: 5 TABLET, FILM COATED ORAL at 19:58

## 2020-03-02 RX ADMIN — LOSARTAN POTASSIUM 100 MG: 50 TABLET, FILM COATED ORAL at 10:03

## 2020-03-02 RX ADMIN — Medication 10 ML: at 10:05

## 2020-03-02 RX ADMIN — TAMSULOSIN HYDROCHLORIDE 0.4 MG: 0.4 CAPSULE ORAL at 10:04

## 2020-03-02 RX ADMIN — HYDROCHLOROTHIAZIDE 12.5 MG: 25 TABLET ORAL at 10:03

## 2020-03-02 ASSESSMENT — PAIN DESCRIPTION - PROGRESSION
CLINICAL_PROGRESSION: NOT CHANGED

## 2020-03-02 ASSESSMENT — ENCOUNTER SYMPTOMS
ABDOMINAL DISTENTION: 0
COLOR CHANGE: 0
CHOKING: 0
CHEST TIGHTNESS: 0
RECTAL PAIN: 0
VOICE CHANGE: 0
FACIAL SWELLING: 0
ANAL BLEEDING: 0
PHOTOPHOBIA: 0
STRIDOR: 0
APNEA: 0
EYE ITCHING: 0

## 2020-03-02 ASSESSMENT — PAIN DESCRIPTION - PAIN TYPE
TYPE: CHRONIC PAIN

## 2020-03-02 ASSESSMENT — PAIN SCALES - WONG BAKER
WONGBAKER_NUMERICALRESPONSE: 8
WONGBAKER_NUMERICALRESPONSE: 8

## 2020-03-02 ASSESSMENT — PAIN DESCRIPTION - ORIENTATION
ORIENTATION: LEFT
ORIENTATION: LEFT

## 2020-03-02 ASSESSMENT — PAIN DESCRIPTION - LOCATION
LOCATION: BACK
LOCATION: SHOULDER
LOCATION: BACK

## 2020-03-02 ASSESSMENT — PAIN DESCRIPTION - ONSET: ONSET: ON-GOING

## 2020-03-02 ASSESSMENT — PAIN DESCRIPTION - FREQUENCY: FREQUENCY: CONTINUOUS

## 2020-03-02 ASSESSMENT — PAIN DESCRIPTION - DESCRIPTORS
DESCRIPTORS: ACHING;CONSTANT
DESCRIPTORS: CONSTANT;ACHING;SHARP
DESCRIPTORS: ACHING;CONSTANT

## 2020-03-02 ASSESSMENT — PAIN SCALES - GENERAL: PAINLEVEL_OUTOF10: 0

## 2020-03-02 NOTE — PROGRESS NOTES
KATHERINE received a call from Yared Mixon at [facility] stating that they just received notice that they do not accept pt's insurance. Yared Mixon is going to double check as she said they usually do accept [employer]. In the meantime, [facility] completed onsite. She did start pre-cert this day. The one condition is that pt's wife has to tour the building. KATHERINE did inform pt's wife who said she will go tour prior to discharge.  7000 is started in [facility]

## 2020-03-02 NOTE — PROGRESS NOTES
[address] [facility]    INPATIENT OCCUPATIONAL THERAPY  PROGRESS NOTE  Date: 3/2/2020  Patient Name: Oral Lindsay      Room: 9559/4786-39  MRN: [id number]    : 1945  (75 y.o.) Gender: male     Discharge Recommendations:  Further Occupational  Therapy is recommended upon facility discharge. OT Equipment Recommendations  Equipment Needed: Yes  Mobility Devices: Nancy Amos: Platform Bilateral, Rolling    Referring Practitioner: Dr Jeri Lua  Diagnosis: Frequent Falls   General  Chart Reviewed: Yes, Orders, History and Physical, Imaging, Progress Notes, Previous Admission  Patient assessed for rehabilitation services?: Yes  Additional Pertinent Hx: progressive increase in confusion and increased need for assistance at home   Family / Caregiver Present: Yes  Referring Practitioner: Dr Jeri Lau  Diagnosis: Frequent Falls     Restrictions  Restrictions/Precautions: Fall Risk(peripheral IV right forearm)  Implants present? : Metal implants(L TKA, L DENISSE, R shoulder replacement, lumbar laminectomy, sternal wires from CABG)  Other position/activity restrictions: up w/ assist  Required Braces or Orthoses?: No      Subjective  Subjective: \"I have to go to the bathroom. \" pt states multiple times that he needed to use urinal x 3, urinal placed and pt unsuccessful. Comments: pt presents c R lean and L side neglect. pt req mod VCs to look to L side during dynamic reach activity  Patient Currently in Pain: Yes(Patient initially denies pain, moans in pain everytime he moves L UE and reports L shoulder very high pain levels)  Pain Assessment  Pain Assessment: Faces  Velez-Baker Pain Rating: Hurts whole lot  Pain Type: Chronic pain  Pain Location: Shoulder  Pain Orientation: Left  Pain Descriptors: Constant; Aching; Sharp      Overall Orientation Status: Within Functional Limits  Patient Observation  Observations: P L hand coordination, Left neglect noted, heavy right lean in supine, requires max cues to turn head and look left. RN Kalie Hudson notified. Objective     Bed mobility  Rolling to Left: 2 Person assistance; Moderate assistance  Supine to Sit: Maximum assistance;2 Person assistance  Sit to Supine: Maximum assistance;2 Person assistance  Scooting: Maximal assistance;2 Person assistance     Standing Balance  Time: ~30\" x 3 in anjali michelle  Activity: static stand  Comment: pt demo P posture in standing despite verbal/tactile cues, patient with poor LUE strength/coordination and ROM. VCs req  to visualize left UE on anjali martinez. flexed knees, kyphotic, unable to bring hips forward into standing position without max tactile cues, pt unable to follow 1 step directions at times       ADL  Feeding: Setup;Supervision; Increased time to complete  Grooming: Moderate assistance  UE Bathing: Moderate assistance;Maximum assistance  LE Bathing: Maximum assistance;Dependent/Total  UE Dressing: Moderate assistance;Maximum assistance  LE Dressing: Dependent/Total  Toileting: Dependent/Total  Additional Comments: answers based on skilled observation/clinical reasoning     Transfers  Sit to stand: 2 Person assistance;Maximum assistance(from bed level, [facility] Daniel)  Stand to sit: 2 Person Assistance; Moderate Assistance(anjali martinez)    Exercises  Grasp/Release: HEP, L hand, yellow sponge x 10 reps to increase strength and FM coordination for ADLs and transfers, pt encouraged to complete thoughout day, pt spouse states that she will encourage pt to complete exercises as able          Assessment  Performance deficits / Impairments: Decreased functional mobility ; Decreased ROM; Decreased safe awareness;Decreased endurance;Decreased balance;Decreased coordination;Decreased ADL status; Decreased strength;Decreased cognition  Prognosis: Fair;Good  Discharge Recommendations: Patient would benefit from continued therapy after discharge  Activity Tolerance: Patient limited by fatigue;Treatment limited secondary to decreased cognition;Patient limited by pain  Safety Devices in place: Yes  Type of devices: Call light within reach; Left in bed;Bed alarm in place             Patient Education: OT POC, bed mobility, posture in stand, scanning L to locate items, HEP  Learner:patient and significant other  Method: demonstration, explanation and handout       Outcome: acknowledged understanding and needs reinforcement     Plan  Safety Devices  Safety Devices in place: Yes  Type of devices: Call light within reach, Left in bed, Bed alarm in place  Plan  Times per week: 2-5 sessions   Times per day: Daily  Current Treatment Recommendations: Strengthening, ROM, Functional Mobility Training, Endurance Training, Cognitive Reorientation, Pain Management, Safety Education & Training, Patient/Caregiver Education & Training, Self-Care / ADL      Goals  Short term goals  Time Frame for Short term goals: 2-5 days  Short term goal 1: Tolerate 10-25 minutes of general activity / care with good attention to task and safety   Short term goal 2: Participate in care using safe techniques for seated self care and mobility   Short term goal 3: Pt and Pt's caregivers will V/D Good understanding of AE/DME/modified techniques for increased IND with self-care and mobility  Short term goal 4: Pt and Pt's caregivers will V/D Good understanding of Fall Prevention strategies for increased IND with self-care and mobility.     OT Individual Minutes  Time In: 1013  Time Out: 1107  Minutes: 54      Electronically signed by YAKELIN Meyer on 3/2/20 at 6:14 PM

## 2020-03-02 NOTE — CARE COORDINATION
DISCHARGE PLANNING NOTE:     LSW following for discharge to SNF-[facility] with pre-cert started 4/15.     Admitted with frequent falls, hx dementia.     PT/OT rec SNF.     Will continue to follow for additional d/c needs.     Electronically signed by Ashleigh Berumen RN on 3/2/2020 at 8:51 AM

## 2020-03-02 NOTE — PROGRESS NOTES
Physical Therapy  Facility/Department: [facility] MED SURG  Daily Treatment Note  NAME: Tracy Murillo  : 1945  MRN: [id number]    Date of Service: 3/2/2020    Discharge Recommendations:  Patient would benefit from continued therapy after discharge   PT Equipment Recommendations  Equipment Needed: (TBD)    Assessment   Body structures, Functions, Activity limitations: Decreased functional mobility ; Decreased strength;Decreased endurance;Decreased safe awareness;Decreased balance; Increased pain  Assessment: continue per POC to maxmize potential for safe D/C, pt has hx of frequent falls at home. Currently requiring 2 assist for bed mobility and transfers  Treatment Diagnosis: impaired mobility  History: admitted following  a fall down steps at home  PT Education: Weight-bearing Education; Injury Prevention;General Safety; Functional Mobility Training;Transfer Training;Pressure Relief; Family Education  Barriers to Learning: cognition- confusion  REQUIRES PT FOLLOW UP: Yes  Activity Tolerance  Activity Tolerance: Patient limited by fatigue;Patient limited by endurance; Patient limited by cognitive status; Patient limited by pain     Patient Diagnosis(es): The primary encounter diagnosis was Fall, initial encounter. Diagnoses of Skin tear of hand without complication, initial encounter and AAA (abdominal aortic aneurysm) without rupture ([address] were also pertinent to this visit. has a past medical history of Arthritis, ED (erectile dysfunction), Elevated PSA, Hypercholesterolemia, Hypertension, MI (myocardial infarction) ([address] and Occult blood in stools. has a past surgical history that includes pr sono guide needle biopsy (13); Knee arthroscopy (Left, ); Carotid endarterectomy (); Coronary artery bypass graft (); Hip arthroscopy (Left, ); Total hip arthroplasty (); hernia repair (); lumbar laminectomy (); Percutaneous Transluminal Coronary Angio (, );  Total knee arthroplasty (1999); joint replacement (Right, 1996); ERCP; Colonoscopy (2-25-08); Cholecystectomy (2007); and Colonoscopy (6 15 15). Restrictions  Restrictions/Precautions  Restrictions/Precautions: Fall Risk(peripheral IV right forearm)  Required Braces or Orthoses?: No  Implants present? : Metal implants(L TKA, L DENISSE, R shoulder replacement, lumbar laminectomy, sternal wires from CABG)  Position Activity Restriction  Other position/activity restrictions: up w/ assist  Subjective   General  Chart Reviewed: Yes  Response To Previous Treatment: Patient with no complaints from previous session. Family / Caregiver Present: Yes(Spouse)  Referring Practitioner: Dr. Sammie Rivera: \"I have to go to the bathroom. \" Patient provided with urinal multiple times throughout session and was unsuccessful in urinating. RN notifed   Pain Screening  Patient Currently in Pain: Yes(Patient initially denies pain, moans in pain everytime he moves L UE and reports L shoulder very high pain levels)  Pain Assessment  Pain Assessment: Faces  Velez-Baker Pain Rating: Hurts whole lot  Pain Type: Chronic pain  Pain Location: Shoulder  Pain Orientation: Left  Pain Descriptors: Constant; Aching; Sharp  Vital Signs  Patient Currently in Pain: Yes(Patient initially denies pain, moans in pain everytime he moves L UE and reports L shoulder very high pain levels)  Patient Observation  Observations: Left neglect noted, heavy right lean in supine, requires max cues to turn head and look left. RN Rick Daviesch notified.         Orientation  Orientation  Overall Orientation Status: Impaired  Orientation Level: Oriented to place;Oriented to person;Disoriented to time;Disoriented to situation  Cognition      Objective   Bed mobility  Supine to Sit: Maximum assistance;2 Person assistance  Sit to Supine: Maximum assistance;2 Person assistance  Scooting: Maximal assistance;2 Person assistance  Comment: Very slumped posture with forward protruded head, poor sitting balance  Short term goal 5: pt to demonstrate transfers sit <> stand on the Renel[address] Lyme w/ min x 2 w/ pt demonstrating knee control x 3 minutes  Short term goal 6: pt to advance to and demonstrate transfers sit <> stand and bed <> chair  w/ min x 2 using w walker when pt can demonstrate knee control on the Ren[address] Lyme  Short term goal 7: pt to advance to and demonstrate gait w/ w walker 20-30'   w/ min x 2 and W/C follow  Short term goal 8: pt to advance to and demonstrate fair static and dynamic balance using  w walker  w/ min x 2  Patient Goals   Patient goals : did not state a goal    Plan    Plan  Times per week: 5-7 treatments/ week  Times per day: (5-7 treatments/ week)  Specific instructions for Next Treatment: 3-1-2020 mod x 2 to roll to the left,  Max x 2  transfer supine > sit , mod x 2  sit <> stand 3 attempts on the Ren[address] Lyme (1 1/2  minutes and 1 minute twice), HIGH FALL RISK , completed seated bilateral UE and LE exercises w/ strong verbal cues  Current Treatment Recommendations: Strengthening, Transfer Training, Endurance Training, Patient/Caregiver Education & Training, Positioning, Balance Training, Gait Training, Functional Mobility Training, Safety Education & Training  Safety Devices  Type of devices:  All fall risk precautions in place, Bed alarm in place, Call light within reach, Gait belt, Patient at risk for falls, Left in bed, Nurse notified(DONNA Eli, Wife present in room)     Therapy Time   Individual Concurrent Group Co-treatment   Time In 1012         Time Out 1002 [place], [place]

## 2020-03-02 NOTE — PLAN OF CARE
Problem: Falls - Risk of:  Goal: Will remain free from falls  Description  Will remain free from falls  3/2/2020 0334 by Tony Aparicio RN  Outcome: Met This Shift   Pt. Free of falls and injuries this shift. Problem: Falls - Risk of:  Goal: Absence of physical injury  Description  Absence of physical injury  3/2/2020 0334 by Tony Aparicio RN  Outcome: Met This Shift   No injuries this shift. Patient's safety maintained. Problem: Risk for Impaired Skin Integrity  Goal: Tissue integrity - skin and mucous membranes  Description  Structural intactness and normal physiological function of skin and  mucous membranes.   3/2/2020 0334 by Tony Aparicio RN  Outcome: Ongoing     Problem: Pain:  Goal: Pain level will decrease  Description  Pain level will decrease  3/2/2020 0334 by Tony Aparicio RN  Outcome: Ongoing     Problem: Pain:  Goal: Control of acute pain  Description  Control of acute pain  3/2/2020 0334 by Tony Aparicio RN  Outcome: Ongoing     Problem: Pain:  Goal: Control of chronic pain  Description  Control of chronic pain  3/2/2020 0334 by Tony Aparicio RN  Outcome: Ongoing

## 2020-03-03 ENCOUNTER — APPOINTMENT (OUTPATIENT)
Dept: CT IMAGING | Age: 75
End: 2020-03-03
Payer: MEDICARE

## 2020-03-03 ENCOUNTER — HOSPITAL ENCOUNTER (EMERGENCY)
Age: 75
Discharge: ANOTHER HOSPITAL | End: 2020-03-03
Payer: MEDICARE

## 2020-03-03 VITALS
RESPIRATION RATE: 16 BRPM | OXYGEN SATURATION: 94 % | HEART RATE: 67 BPM | TEMPERATURE: 97.1 F | DIASTOLIC BLOOD PRESSURE: 102 MMHG | SYSTOLIC BLOOD PRESSURE: 138 MMHG

## 2020-03-03 VITALS
TEMPERATURE: 97.9 F | HEIGHT: 70 IN | OXYGEN SATURATION: 98 % | SYSTOLIC BLOOD PRESSURE: 148 MMHG | DIASTOLIC BLOOD PRESSURE: 128 MMHG | WEIGHT: 163 LBS | HEART RATE: 87 BPM | BODY MASS INDEX: 23.34 KG/M2 | RESPIRATION RATE: 18 BRPM

## 2020-03-03 PROCEDURE — 97535 SELF CARE MNGMENT TRAINING: CPT

## 2020-03-03 PROCEDURE — 6370000000 HC RX 637 (ALT 250 FOR IP): Performed by: EMERGENCY MEDICINE

## 2020-03-03 PROCEDURE — 70450 CT HEAD/BRAIN W/O DYE: CPT

## 2020-03-03 PROCEDURE — 51798 US URINE CAPACITY MEASURE: CPT

## 2020-03-03 PROCEDURE — 72125 CT NECK SPINE W/O DYE: CPT

## 2020-03-03 PROCEDURE — 4500000002 HC ER NO CHARGE

## 2020-03-03 PROCEDURE — 6370000000 HC RX 637 (ALT 250 FOR IP): Performed by: INTERNAL MEDICINE

## 2020-03-03 PROCEDURE — 97110 THERAPEUTIC EXERCISES: CPT

## 2020-03-03 PROCEDURE — 2580000003 HC RX 258: Performed by: EMERGENCY MEDICINE

## 2020-03-03 PROCEDURE — 97530 THERAPEUTIC ACTIVITIES: CPT

## 2020-03-03 PROCEDURE — 12011 RPR F/E/E/N/L/M 2.5 CM/<: CPT

## 2020-03-03 RX ADMIN — TAMSULOSIN HYDROCHLORIDE 0.4 MG: 0.4 CAPSULE ORAL at 12:12

## 2020-03-03 RX ADMIN — MULTIPLE VITAMINS W/ MINERALS TAB 1 TABLET: TAB at 12:13

## 2020-03-03 RX ADMIN — ACETAMINOPHEN 1000 MG: 500 TABLET, FILM COATED ORAL at 10:23

## 2020-03-03 RX ADMIN — APIXABAN 5 MG: 5 TABLET, FILM COATED ORAL at 12:14

## 2020-03-03 RX ADMIN — AMLODIPINE BESYLATE 5 MG: 5 TABLET ORAL at 12:11

## 2020-03-03 RX ADMIN — ATORVASTATIN CALCIUM 20 MG: 20 TABLET, FILM COATED ORAL at 12:14

## 2020-03-03 RX ADMIN — LOSARTAN POTASSIUM 100 MG: 50 TABLET, FILM COATED ORAL at 12:12

## 2020-03-03 RX ADMIN — BUSPIRONE HYDROCHLORIDE 10 MG: 5 TABLET ORAL at 12:13

## 2020-03-03 RX ADMIN — HYDROCHLOROTHIAZIDE 12.5 MG: 25 TABLET ORAL at 12:12

## 2020-03-03 RX ADMIN — Medication 10 ML: at 12:18

## 2020-03-03 RX ADMIN — ACETAMINOPHEN 1000 MG: 500 TABLET, FILM COATED ORAL at 02:11

## 2020-03-03 RX ADMIN — METOPROLOL TARTRATE 50 MG: 50 TABLET, FILM COATED ORAL at 12:11

## 2020-03-03 RX ADMIN — ASPIRIN 325 MG ORAL TABLET 162 MG: 325 PILL ORAL at 12:12

## 2020-03-03 ASSESSMENT — ENCOUNTER SYMPTOMS
ABDOMINAL DISTENTION: 0
STRIDOR: 0
APNEA: 0
RECTAL PAIN: 0
ANAL BLEEDING: 0
CHEST TIGHTNESS: 0
VOICE CHANGE: 0
COLOR CHANGE: 0
PHOTOPHOBIA: 0
EYE ITCHING: 0
CHOKING: 0
FACIAL SWELLING: 0

## 2020-03-03 ASSESSMENT — PAIN SCALES - GENERAL
PAINLEVEL_OUTOF10: 6
PAINLEVEL_OUTOF10: 3
PAINLEVEL_OUTOF10: 5
PAINLEVEL_OUTOF10: 10
PAINLEVEL_OUTOF10: 5
PAINLEVEL_OUTOF10: 10
PAINLEVEL_OUTOF10: 5
PAINLEVEL_OUTOF10: 10

## 2020-03-03 ASSESSMENT — PAIN DESCRIPTION - PAIN TYPE
TYPE: ACUTE PAIN
TYPE: ACUTE PAIN
TYPE: CHRONIC PAIN
TYPE: ACUTE PAIN

## 2020-03-03 ASSESSMENT — PAIN DESCRIPTION - LOCATION
LOCATION: BACK
LOCATION: BACK;SHOULDER
LOCATION: BACK

## 2020-03-03 ASSESSMENT — PAIN SCALES - WONG BAKER: WONGBAKER_NUMERICALRESPONSE: 8

## 2020-03-03 ASSESSMENT — PAIN DESCRIPTION - ORIENTATION: ORIENTATION: LEFT;LOWER;MID;UPPER

## 2020-03-03 NOTE — PROGRESS NOTES
Progress Note    3/3/2020   12:44 PM    Name:  Christophe Singleton  MRN:    [id number]     Acct:     [de-identified]   Room:  [facility]9261-06  IP Day: 6     Admit Date: 2/26/2020  7:43 PM  PCP: Yoselyn Berrios MD    Subjective:     C/C:   Chief Complaint   Patient presents with   Teresa Mccarty       Interval History: Status: not changed. Patient still has weakness unable to get up by himself even with two-person assist patient has very limited movement  Patient still has confusion not following commands    2/29  Ms baseline   no fever   difficulty urinating    3/1 poor sleep  occ diliriun        3/2     ms better  Weakness    eating better    3/3   Ms betetr   Weakness   no fever     ROS:   all 10 systems reviewed and are negative except as noted    Review of Systems   Constitutional: Negative for appetite change and fatigue. HENT: Negative for drooling, facial swelling, mouth sores, sneezing and voice change. Eyes: Negative for photophobia and itching. Respiratory: Negative for apnea, choking, chest tightness and stridor. Cardiovascular: Negative for chest pain, palpitations and leg swelling. Gastrointestinal: Negative for abdominal distention, anal bleeding and rectal pain. Endocrine: Negative for polydipsia and polyuria. Genitourinary: Negative for difficulty urinating, flank pain, frequency and urgency. Musculoskeletal: Negative for gait problem, joint swelling, myalgias and neck stiffness. Skin: Negative for color change and wound. Allergic/Immunologic: Negative for food allergies. Neurological: Positive for weakness. Negative for seizures, facial asymmetry, speech difficulty, light-headedness and headaches. Hematological: Negative for adenopathy. Does not bruise/bleed easily. Psychiatric/Behavioral: Positive for confusion. The patient is nervous/anxious. Medications: Allergies:    Allergies   Allergen Reactions    Morphine Other (See Comments)     Combative         Current Meds: busPIRone (BUSPAR) tablet 10 mg, BID  amLODIPine (NORVASC) tablet 5 mg, Daily  apixaban (ELIQUIS) tablet 5 mg, BID  aspirin tablet 162 mg, Daily  atorvastatin (LIPITOR) tablet 20 mg, Daily  calcium carbonate (TUMS) chewable tablet 500 mg, BID PRN  docusate sodium (COLACE) capsule 100 mg, PRN  therapeutic multivitamin-minerals 1 tablet, Daily  tamsulosin (FLOMAX) capsule 0.4 mg, Daily  sodium chloride flush 0.9 % injection 10 mL, 2 times per day  sodium chloride flush 0.9 % injection 10 mL, PRN  melatonin ER tablet 1 mg, Nightly PRN  losartan (COZAAR) tablet 100 mg, Daily    And  hydroCHLOROthiazide (HYDRODIURIL) tablet 12.5 mg, Daily  metoprolol tartrate (LOPRESSOR) tablet 50 mg, BID  acetaminophen (TYLENOL) tablet 1,000 mg, Q8H PRN  ondansetron (ZOFRAN-ODT) disintegrating tablet 4 mg, Q8H PRN        Data:     Code Status:  Full Code    Family History   Problem Relation Age of Onset    Coronary Art Dis Father     Hypertension Father     Coronary Art Dis Mother     Hypertension Mother     Coronary Art Dis Brother     Hypertension Brother     Coronary Art Dis Sister     Hypertension Sister        Social History     Socioeconomic History    Marital status:      Spouse name: Not on file    Number of children: Not on file    Years of education: Not on file    Highest education level: Not on file   Occupational History    Not on file   Social Needs    Financial resource strain: Not on file    Food insecurity:     Worry: Not on file     Inability: Not on file    Transportation needs:     Medical: Not on file     Non-medical: Not on file   Tobacco Use    Smoking status: Former Smoker     Packs/day: 1.00     Last attempt to quit: 1983     Years since quittin.2    Smokeless tobacco: Never Used   Substance and Sexual Activity    Alcohol use: No     Alcohol/week: 0.0 standard drinks     Comment: Previously 4 drinks per month, no alcohol since 12/10/1982    Drug use: No    Sexual activity: Not on file Lifestyle    Physical activity:     Days per week: Not on file     Minutes per session: Not on file    Stress: Not on file   Relationships    Social connections:     Talks on phone: Not on file     Gets together: Not on file     Attends Buddhism service: Not on file     Active member of club or organization: Not on file     Attends meetings of clubs or organizations: Not on file     Relationship status: Not on file    Intimate partner violence:     Fear of current or ex partner: Not on file     Emotionally abused: Not on file     Physically abused: Not on file     Forced sexual activity: Not on file   Other Topics Concern    Not on file   Social History Narrative    Not on file       I/O (24Hr): Intake/Output Summary (Last 24 hours) at 3/3/2020 1244  Last data filed at 3/3/2020 3395  Gross per 24 hour   Intake 0 ml   Output 1400 ml   Net -1400 ml     Radiology:  Xr Chest (single View Frontal)    Result Date: 2/26/2020  1. Focal subsegmental atelectasis or scar lateral lower left lung. 2. Calcific atherosclerosis aorta. 3. Cardiomegaly. 4. Left glenohumeral joint osteoarthritis. If clinically there is concern of underlying rib fracture, sternal or other acute traumatic abnormality of the chest, then additional evaluation with dedicated imaging of the area of interest versus CT chest should be considered. Ct Head Wo Contrast    Result Date: 2/26/2020  Limited exam due to motion Multiple areas of encephalomalacia with surrounding gliotic change Multiple old infarcts Multifocal small-vessel ischemic change No acute hemorrhage     Ct Lumbar Spine Wo Contrast    Result Date: 2/26/2020  No acute fracture Multilevel degenerative disc disease as described. See above for details of each level. If more detail is desired, consider MRI Aortic aneurysm. RECOMMENDATIONS: For management of fusiform AAA: * For management of saccular abdominal aortic aneurysms of any size, recommend vascular consultation.  Note:  For baseline. He is disoriented. Motor: No abnormal muscle tone or seizure activity. Coordination: Coordination normal.      Gait: Gait abnormal.   Psychiatric:         Mood and Affect: Mood is anxious. Assessment:        Primary Problem  Frequent falls     Principal Problem:    Frequent falls  Active Problems:    Essential hypertension    Hyperlipidemia    Severe obesity (BMI 35.0-39. 9) with comorbidity ([facility] [address]    Ataxia    Ambulatory dysfunction    Hallucination, drug-induced ([facility] [address] [facility]    Confusion  Resolved Problems:    * No resolved hospital problems. *      Past Medical History:   Diagnosis Date    Arthritis     ED (erectile dysfunction)     Elevated PSA     Hypercholesterolemia     Hypertension     MI (myocardial infarction) ([facility] [address] [facility]     Occult blood in stools 10/19/2015        Plan:        1. Consult neurology  2. PPI  3. DVT Prophylaxis  4. EPCs  5. PT/OT to evaluate and treat  6.  check and replace electrolytes per sliding scale  7. Discussed with patient's wife in detail about discharge planning patient will need ECF      2/29   Falls    due to multi infarct dementia   ataxia  And djd   non syncopal meds revieved  Urinary retention   chck post void residuals   H/o elevated psa  Recheck psa   Cr nl  Ct neg   ua neg  Wbc 12  Follow   3/1  Dementia related diliriun meds revieved   Anxiety component      Neurology eval noted     fall precautions      increase buspar  On eleiquis   hctx ? ?     bp 120/80     3/2     ms betetr less anxious with buspar  bp controlled  ?  Why on eleiquis      Weakness   dementai     Will need ecf   pt     3/3   Ms stable   still confused  dbp 128   will follow       cont same     ecf placement    Electronically signed by Errol Goddard MD

## 2020-03-03 NOTE — PROGRESS NOTES
kira  Comment: P stand tolerance; heavy R lean , increased ability to Socket Mobile RUE grasp this date  Functional Mobility  Functional Mobility Comments: kira Ax2-3; pt become agitated and refused to sit on paddles during transport   ADL  Feeding: Setup;Supervision; Increased time to complete  Grooming: Moderate assistance  UE Bathing: Moderate assistance;Maximum assistance  LE Bathing: Maximum assistance;Dependent/Total  UE Dressing: Moderate assistance;Maximum assistance  LE Dressing: Dependent/Total  Toileting: Dependent/Total  Additional Comments: TA for brief change/toileting while in honey. Pt A c standing/BUE support only as able. He is easily faituged and required  multiple stands to complete task. Pt has had 3 bowel movements this AM per nursing. Returned to bed per adamant request; pt placed on bed pan. Transfers  Sit to stand: 2 Person assistance;Maximum assistance; Moderate assistance(from bed level)  Stand to sit: Contact guard assistance  Transfer Comments: Improved ease grasping horizontal grab bar on SS; Mod x2 progressing from [address] [place] [address] Street date prior; pt requiring Ax3 from recliner 2* agitation and dififculty following directions  Toilet Transfers  Toilet Transfers Comments: pt not safe for toileting on toilet this date; pt agitated and required max education c P return            Adaptations: recliner implemented to encourage OOB c decreased seated balance; VC for use of chris tech PRN; elevation for edema reduction                      Assessment  Performance deficits / Impairments: Decreased functional mobility ; Decreased ROM; Decreased safe awareness;Decreased endurance;Decreased balance;Decreased coordination;Decreased ADL status; Decreased strength;Decreased cognition  Assessment: progressing with fxl mobilty; poor insight to safety and current level of fx  Prognosis: Fair;Good  Discharge Recommendations: Patient would benefit from continued therapy after discharge  Activity Tolerance: Patient limited by fatigue;Treatment limited secondary to decreased cognition;Patient limited by pain  Safety Devices in place: Yes  Type of devices: Call light within reach; Left in bed;Bed alarm in place             Patient Education:   UE compensation; standing; toileting  Learner:patient  Method: demonstration and explanation       Outcome: needs reinforcement     Plan  Safety Devices  Safety Devices in place: Yes  Type of devices: Call light within reach, Left in bed, Bed alarm in place  Plan  Times per week: 2-5 sessions   Times per day: Daily  Current Treatment Recommendations: Strengthening, ROM, Functional Mobility Training, Endurance Training, Cognitive Reorientation, Pain Management, Safety Education & Training, Patient/Caregiver Education & Training, Self-Care / ADL      Goals  Short term goals  Time Frame for Short term goals: 2-5 days  Short term goal 1: Tolerate 10-25 minutes of general activity / care with good attention to task and safety   Short term goal 2: Participate in care using safe techniques for seated self care and mobility   Short term goal 3: Pt and Pt's caregivers will V/D Good understanding of AE/DME/modified techniques for increased IND with self-care and mobility  Short term goal 4: Pt and Pt's caregivers will V/D Good understanding of Fall Prevention strategies for increased IND with self-care and mobility.            03/03/20 0915 03/03/20 0916   OT Individual Minutes   Time In 0820 0901   Time Out 0851 0912   Minutes 31 11         Electronically signed by YAKELIN Vázquez on 3/3/20 at 4:03 PM

## 2020-03-03 NOTE — PROGRESS NOTES
exercises 6: Bed mobility   Other exercises 7: bed <> chair transfer   Other exercises 8: Educated on poor posture and significant time laying in bed can increase back pain, patient encouraged to stay OOB minimum 1 hour in recliner chair. Other Activities: (Patient had soiled breif. Brief change and [place] [facility] standing in anjali stedy )       Goals  Short term goals  Time Frame for Short term goals: 5-7 treatments/ week  Short term goal 1: pt to tolerate 1/2 hour of therapuetic exercise and activity  Short term goal 2: pt to demonstrate good technique for LE strengthening exercises and balance activities by following commands accurately 75% of the time  Short term goal 3: pt to demonstrate rolling in bed and transfers supine <> sit w/ min x 2  Short term goal 4: pt to demonstrate dangling at the EOB x 20 minutes w/ min x 1 w/ good sitting balance  Short term goal 5: pt to demonstrate transfers sit <> stand on the Anjali Stedy w/ min x 2 w/ pt demonstrating knee control x 3 minutes  Short term goal 6: pt to advance to and demonstrate transfers sit <> stand and bed <> chair  w/ min x 2 using w walker when pt can demonstrate knee control on the Renel[place] Lyme  Short term goal 7: pt to advance to and demonstrate gait w/ w walker 20-30'   w/ min x 2 and W/C follow  Short term goal 8: pt to advance to and demonstrate fair static and dynamic balance using  w walker  w/ min x 2  Patient Goals   Patient goals : did not state a goal    Plan    Plan  Times per week: 5-7 treatments/ week  Times per day: (5-7 treatments/ week)  Specific instructions for Next Treatment: . Current Treatment Recommendations: Strengthening, Transfer Training, Endurance Training, Patient/Caregiver Education & Training, Positioning, Balance Training, Gait Training, Functional Mobility Training, Safety Education & Training  Safety Devices  Type of devices:  All fall risk precautions in place, Bed alarm in place, Call light within reach, Gait belt, Patient

## 2020-03-03 NOTE — FLOWSHEET NOTE
Alexandria, STEPH, asked writer to see patient. Alexandria's brother, known to writer, was visiting patient who was his . Patient shared some of his medical challenges. He appreciated visit and prayer. 03/02/20 1859   Encounter Summary   Services provided to: Patient and family together   Referral/Consult From:   (PCA)   Support System Spouse; Family members;Friends/neighbors   Continue Visiting   (3-2-20)   Complexity of Encounter Moderate   Length of Encounter 15 minutes   Spiritual Assessment Completed Yes   Spiritual/Buddhism   Type Spiritual support   Assessment Calm; Approachable   Intervention Active listening;Explored feelings, thoughts, concerns;Explored coping resources;Prayer;Sustaining presence/ Ministry of presence; Discussed illness/injury and it's impact   Outcome Expressed gratitude;Engaged in conversation;Expressed feelings/needs/concerns;Receptive

## 2020-03-03 NOTE — PROGRESS NOTES
Patient picked up via [facility] for transport to [facility]. IV removed. Wife at bedside. Patient's personal belongings sent with patient.

## 2020-03-03 NOTE — PROGRESS NOTES
SW spoke to [facility] from [facility]. Pt was approved by insurance and wife completed onsite visit. Transport set for 1400. SW informed wife who will follow pt to SNF. Orders sent to facility. 7000 completed in HENS.

## 2020-03-04 NOTE — ED PROVIDER NOTES
[facility] ED  EMERGENCY DEPARTMENT ENCOUNTER      Pt Name: Bethany Bryant  MRN: [id number]  [place] 1945  Date of evaluation: 3/3/2020  Provider: Chito Coronel II, PA-C    CHIEF COMPLAINT       Chief Complaint   Patient presents with    Laceration     left forehead after rolling out of bed, no LOC. HISTORY OF PRESENT ILLNESS    Bethany Bryant is a 66 y.o. male who presents to the emergency department from nursing home where he rolled out of bed and struck the left side of his head. There was no loss of consciousness. He was being cared for by the nursing home at that time. Patient was just discharged from [facility] for psychiatric evaluation and altered mental status sent to a nursing home today at 2 PM the nursing home stated that he could not be managed there because they could not keep him in bed and the patient is unable to ambulate without falling and he was uncooperative. The patient's wife presents the history that I am presenting at this time and she states that she was told by the nursing home that they called [facility] psychiatric facility and that the patient would have a bed there and while he was waiting for transfer for the bed he fell and hit his head so they sent him to this emergency department, not the closest emergency department to their facility, because this emergency department was closest to [facility]. Triage notes and Nursing notes were reviewed by myself. Any discrepancies are addressed above.     PAST MEDICAL HISTORY     Past Medical History:   Diagnosis Date    Arthritis     ED (erectile dysfunction)     Elevated PSA     Hypercholesterolemia     Hypertension     MI (myocardial infarction) ([facility] [address]     Occult blood in stools 10/19/2015       SURGICAL HISTORY       Past Surgical History:   Procedure Laterality Date    CAROTID ENDARTERECTOMY  1998    bilateral    CHOLECYSTECTOMY  2007    COLONOSCOPY  2-25-08    COLONOSCOPY  6 15 15    polyp,bx    CORONARY ARTERY BYPASS GRAFT  1992    3 vessel    ERCP      with stent    HERNIA REPAIR  1988    inguinal    HIP ARTHROSCOPY Left 2000    JOINT REPLACEMENT Right 1996    shoulder    KNEE ARTHROSCOPY Left 1981    LUMBAR LAMINECTOMY  1990    RI SONO GUIDE NEEDLE BIOPSY  1/8/13    PTCA  2004, 1984    TOTAL HIP ARTHROPLASTY  1990    TOTAL KNEE ARTHROPLASTY  1999    left       CURRENT MEDICATIONS       Previous Medications    ACETAMINOPHEN (TYLENOL ARTHRITIS PAIN) 650 MG CR TABLET    Take 650 mg by mouth 2 times daily     AMLODIPINE (NORVASC) 5 MG TABLET    Take 5 mg by mouth daily    APIXABAN (ELIQUIS) 5 MG TABS TABLET    Take by mouth 2 times daily. ASPIRIN 81 MG TABLET    Take 162 mg by mouth once     ATORVASTATIN (LIPITOR) 20 MG TABLET    Take 20 mg by mouth daily    BUSPIRONE (BUSPAR) 10 MG TABLET    Take 5 mg by mouth 2 times daily    CALCIUM CARBONATE (TUMS) 500 MG CHEWABLE TABLET    Take 1 tablet by mouth 2 times daily as needed for Heartburn. DOCUSATE SODIUM (COLACE) 100 MG CAPSULE    Take 100 mg by mouth as needed for Constipation    LOSARTAN-HYDROCHLOROTHIAZIDE (HYZAAR) 100-12.5 MG PER TABLET    Take 1 tablet by mouth daily. MELATONIN 5 MG TABS TABLET    Take 1 tablet by mouth daily as needed. METOPROLOL (LOPRESSOR) 50 MG TABLET    Take 50 mg by mouth 2 times daily. MULTIPLE VITAMINS PO    Take 1 tablet by mouth daily. TAMSULOSIN (FLOMAX) 0.4 MG CAPSULE    Take 1 capsule by mouth daily.        ALLERGIES     Morphine    FAMILY HISTORY       Family History   Problem Relation Age of Onset    Coronary Art Dis Father     Hypertension Father     Coronary Art Dis Mother     Hypertension Mother     Coronary Art Dis Brother     Hypertension Brother     Coronary Art Dis Sister     Hypertension Sister         SOCIAL HISTORY       Social History     Socioeconomic History    Marital status:      Spouse name: Not on file    Number of children: Not on file  Years of education: Not on file    Highest education level: Not on file   Occupational History    Not on file   Social Needs    Financial resource strain: Not on file    Food insecurity:     Worry: Not on file     Inability: Not on file    Transportation needs:     Medical: Not on file     Non-medical: Not on file   Tobacco Use    Smoking status: Former Smoker     Packs/day: 1.00     Last attempt to quit: 1983     Years since quittin.2    Smokeless tobacco: Never Used   Substance and Sexual Activity    Alcohol use: No     Alcohol/week: 0.0 standard drinks     Comment: Previously 4 drinks per month, no alcohol since 12/10/1982    Drug use: No    Sexual activity: Not on file   Lifestyle    Physical activity:     Days per week: Not on file     Minutes per session: Not on file    Stress: Not on file   Relationships    Social connections:     Talks on phone: Not on file     Gets together: Not on file     Attends Christianity service: Not on file     Active member of club or organization: Not on file     Attends meetings of clubs or organizations: Not on file     Relationship status: Not on file    Intimate partner violence:     Fear of current or ex partner: Not on file     Emotionally abused: Not on file     Physically abused: Not on file     Forced sexual activity: Not on file   Other Topics Concern    Not on file   Social History Narrative    Not on file       REVIEW OF SYSTEMS     Review of Systems  Unavailable due to the patient's chronic altered mental status and inability to communicate clearly     SCREENINGS           PHYSICAL EXAM    (up to 7 for level 4, 8 or more for level 5)     ED Triage Vitals [20]   BP Temp Temp Source Pulse Resp SpO2 Height Weight   (!) 138/102 97.1 °F (36.2 °C) Tympanic 67 16 94 % -- --       Physical Exam  Active but not alert. .  Nontoxic. No acute distress. Well-hydrated. Head presents with a 0.5 cm laceration lateral left eyebrow.   Cervical spine does not show any step-off or deformity  Respirations nonlabored. Lungs clear to auscultation  Heart regular rate and rhythm  Chest wall nontender  Abdomen nontender  Pelvis stable and nontender  Skin free of any obvious rashes or lesions. Extremities without edema. No tenderness or deformity noted       DIAGNOSTIC RESULTS     RADIOLOGY: (none if blank)   Interpretation per the Radiologistbelow, if available at the time of this note:    CT Head WO Contrast    (Results Pending)   CT CERVICAL SPINE WO CONTRAST    (Results Pending)         EMERGENCY DEPARTMENT COURSE andMedical Decision Making:     Vitals:    Vitals:    03/03/20 1940   BP: (!) 138/102   Pulse: 67   Resp: 16   Temp: 97.1 °F (36.2 °C)   TempSrc: Tympanic   SpO2: 94%       MDM/   Wound repaired. Patient's wife arrives emergency department states he is acting at his baseline. Discharge back to nursing home    ED Medications administered this visit:  Medications - No data to display    CONSULTS: (None if blank)  None    Procedures: (None if blank)  0.5 cm laceration left eyebrow cleaned with normal saline approximated with Dermabond adhesive. CLINICAL     No diagnosis found. DISPOSITION/PLAN   DISPOSITION        PATIENT REFERRED TO:  No follow-up provider specified.     DISCHARGE MEDICATIONS:  New Prescriptions    No medications on file              (Please note that portions of this note were completed with a voice recognition program.  Efforts were made to edit the dictations but occasionallywords are mis-transcribed.)      Hans Carlton II, PA-C (electronically signed)            Hans Carlton II, PA-C  03/03/20 [id number]

## 2020-03-04 NOTE — DISCHARGE SUMMARY
lungs appear otherwise unremarkable. No pneumothorax is seen. No acute osseous abnormality is identified. Sequela from prior CABG with median sternotomy wire fractures. Advanced left glenohumeral osteoarthritic change with very large marginal osteophyte inferior aspect of the left humeral head. 1. Focal subsegmental atelectasis or scar lateral lower left lung. 2. Calcific atherosclerosis aorta. 3. Cardiomegaly. 4. Left glenohumeral joint osteoarthritis. If clinically there is concern of underlying rib fracture, sternal or other acute traumatic abnormality of the chest, then additional evaluation with dedicated imaging of the area of interest versus CT chest should be considered. Ct Head Wo Contrast    Result Date: 3/3/2020  EXAMINATION: CT OF THE HEAD WITHOUT CONTRAST  3/3/2020 7:54 pm TECHNIQUE: CT of the head was performed without the administration of intravenous contrast. Dose modulation, iterative reconstruction, and/or weight based adjustment of the mA/kV was utilized to reduce the radiation dose to as low as reasonably achievable. COMPARISON: February 26, 2020 CT brain HISTORY: ORDERING SYSTEM PROVIDED HISTORY: injury TECHNOLOGIST PROVIDED HISTORY: injury FINDINGS: BRAIN/VENTRICLES: There is no acute intracranial hemorrhage, mass effect or midline shift. No abnormal extra-axial fluid collection. The gray-white differentiation is maintained without evidence of an acute infarct. There is prominence of the ventricles and sulci due to global parenchymal volume loss. There are nonspecific areas of hypoattenuation within the periventricular and subcortical white matter, which likely represent chronic microvascular ischemic change. Right posterior parietal encephalomalacia. Intracranial atherosclerosis. ORBITS: The visualized portion of the orbits demonstrate no acute abnormality. SINUSES: Expansile dense sharply marginated mass right frontal sinus measuring 13 x 29 mm.  SOFT TISSUES/SKULL: No acute abnormality of the visualized skull or soft tissues. Hyperostosis frontalis interna. Chronic involutional changes. Encephalomalacia right posterior parietal lobe unchanged. No acute disease. 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 Ct Head Wo Contrast    Result Date: 2/26/2020  EXAMINATION: CT OF THE HEAD WITHOUT CONTRAST  2/26/2020 8:16 pm TECHNIQUE: CT of the head was performed without the administration of intravenous contrast. Dose modulation, iterative reconstruction, and/or weight based adjustment of the mA/kV was utilized to reduce the radiation dose to as low as reasonably achievable. COMPARISON: March 2018 HISTORY: ORDERING SYSTEM PROVIDED HISTORY: fall, on eliquis TECHNOLOGIST PROVIDED HISTORY: fall, on eliquis Reason for Exam: fall Acuity: Unknown Type of Exam: Unknown FINDINGS: BRAIN/VENTRICLES: Detail is limited due to motion related artifact. Ventricles and sulci are stable. There is no midline shift. Right parietal/occipital encephalomalacia is again noted with surrounding gliosis. There is a questionable small amount of low-density in the posterior cerebellum. This area is limited due to artifact. Low-density is noted in the anterior lentiform nuclei bilaterally. This is stable. Caudate head and external capsule low density foci are again noted. Multifocal low-density in the periventricular and subcortical white matter is again noted. There is no parenchymal or extra-axial hematoma. ORBITS: No acute orbital abnormality is noted SINUSES: No air-fluid levels are noted. Large calcified density in the right frontal ethmoid junction is noted. This is stable. SOFT TISSUES/SKULL:  No acute abnormality of the visualized skull or soft tissues.      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 Multiplanar reformatted images are provided for review. Dose modulation, iterative reconstruction, and/or weight based adjustment of the mA/kV was utilized to reduce the radiation dose to as low as reasonably achievable. COMPARISON: None. HISTORY: ORDERING SYSTEM PROVIDED HISTORY: injury TECHNOLOGIST PROVIDED HISTORY: injury FINDINGS: BONES/ALIGNMENT: There is no acute fracture or traumatic malalignment. DEGENERATIVE CHANGES: There is severe disc space disease identified C5 through C7. Severe facet arthropathy mid and lower cervical spine. Minimal anterolisthesis C4-C5 to 3 mm. .  Moderate severe central canal stenosis C5-6. SOFT TISSUES: There is no prevertebral soft tissue swelling. No acute fracture traumatic malalignment of the cervical spine. Severe degenerate changes C5 through C7     Ct Lumbar Spine Wo Contrast    Result Date: 2/26/2020  EXAMINATION: CT OF THE LUMBAR SPINE WITHOUT CONTRAST  2/26/2020 TECHNIQUE: CT of the lumbar spine was performed without the administration of intravenous contrast. Multiplanar reformatted images are provided for review. Dose modulation, iterative reconstruction, and/or weight based adjustment of the mA/kV was utilized to reduce the radiation dose to as low as reasonably achievable. COMPARISON: None HISTORY: ORDERING SYSTEM PROVIDED HISTORY: fall TECHNOLOGIST PROVIDED HISTORY: fall Reason for Exam: fall; lower back pain FINDINGS: BONES/ALIGNMENT: Slight left convex lumbar curvature is noted. Multiple endplate Schmorl's node deformities are noted. Detail is limited due to artifact from motion. There is no acute displaced fracture. DEGENERATIVE CHANGES: Multilevel degenerative change throughout the lumbar spine is noted.   Endplate and facet hypertrophic changes are noted SOFT TISSUES/RETROPERITONEUM: Vascular calcifications are noted. Maximum AP dimension of the aorta on axial image 35 is approximately 3.7 cm. Maximum dimension of the aorta on axial image 55 is approximately 3.3 x 3.2 cm. A large amount of stool is noted in the visualized portions of the colon. There are no pathologically enlarged retroperitoneal lymph nodes. Left psoas muscle atrophy and fatty infiltration is noted. Multifocal posterior paraspinal muscle atrophic change in fatty infiltration is noted. Spinal canal detail is markedly limited. Multifocal disc bulging in the lower thoracic spine and the lumbar spine is noted. There is multilevel suggested canal and foraminal narrowing. Postoperative changes are noted at L3-4. No acute fracture Multilevel degenerative disc disease as described. See above for details of each level. If more detail is desired, consider MRI Aortic aneurysm. RECOMMENDATIONS: For management of fusiform AAA: * For management of saccular abdominal aortic aneurysms of any size, recommend vascular consultation. Note:  For AAA enlargement of >0.5 cm in 6 months or >1 cm in 1 year, recommend vascular consultation. References: Phuong Rdz Radiol 2013; 74(68):878-124; J Vasc Surg. 2018; 67:2-77         Consultations:    Consults:     Final Specialist Recommendations/Findings:   IP CONSULT TO INTERNAL MEDICINE  IP CONSULT TO NEUROLOGY      The patient was seen and examined on day of discharge and this discharge summary is in conjunction with any daily progress note from day of discharge. Discharge plan:     Disposition:  To a non-[facility] facility    Physician Follow Up:     Maylin Lira MD  [address] [place] [address],[address]  [address] [facility] [address] [phone]             Requiring Further Evaluation/Follow Up POST HOSPITALIZATION/Incidental Findings:     Diet: regular diet    Activity: As tolerated    Instructions to Patient:     Discharge Medications:      Medication List      CONTINUE taking

## 2020-03-12 ENCOUNTER — HOSPITAL ENCOUNTER (OUTPATIENT)
Age: 75
Setting detail: SPECIMEN
Discharge: HOME | End: 2020-03-12
Payer: MEDICARE

## 2020-03-12 LAB
CHOLESTEROL/HDL RATIO: 3.5
CHOLESTEROL: 116 MG/DL
HDLC SERPL-MCNC: 33 MG/DL
LDL CHOLESTEROL: 66 MG/DL (ref 0–130)
TRIGL SERPL-MCNC: 86 MG/DL
VLDLC SERPL CALC-MCNC: ABNORMAL MG/DL (ref 1–30)

## 2020-03-12 PROCEDURE — 36415 COLL VENOUS BLD VENIPUNCTURE: CPT

## 2020-03-12 PROCEDURE — P9603 ONE-WAY ALLOW PRORATED MILES: HCPCS

## 2020-03-12 PROCEDURE — 80061 LIPID PANEL: CPT

## 2020-03-21 ENCOUNTER — HOSPITAL ENCOUNTER (OUTPATIENT)
Age: 75
Setting detail: SPECIMEN
Discharge: HOME | End: 2020-03-21
Payer: MEDICARE

## 2020-03-21 PROCEDURE — 81001 URINALYSIS AUTO W/SCOPE: CPT

## 2020-03-21 PROCEDURE — 87086 URINE CULTURE/COLONY COUNT: CPT

## 2020-03-21 PROCEDURE — 86403 PARTICLE AGGLUT ANTBDY SCRN: CPT

## 2020-03-21 PROCEDURE — 87186 SC STD MICRODIL/AGAR DIL: CPT

## 2020-03-22 LAB
-: ABNORMAL
AMORPHOUS: ABNORMAL
BACTERIA: ABNORMAL
BILIRUBIN URINE: NEGATIVE
CASTS UA: ABNORMAL /LPF
COLOR: YELLOW
COMMENT UA: ABNORMAL
CRYSTALS, UA: ABNORMAL /HPF
EPITHELIAL CELLS UA: ABNORMAL /HPF (ref 0–5)
GLUCOSE URINE: NEGATIVE
KETONES, URINE: NEGATIVE
LEUKOCYTE ESTERASE, URINE: ABNORMAL
MUCUS: ABNORMAL
NITRITE, URINE: POSITIVE
OTHER OBSERVATIONS UA: ABNORMAL
PH UA: 6 (ref 5–9)
PROTEIN UA: NEGATIVE
RBC UA: ABNORMAL /HPF (ref 0–2)
RENAL EPITHELIAL, UA: ABNORMAL /HPF
SPECIFIC GRAVITY UA: 1.02 (ref 1.01–1.02)
TRICHOMONAS: ABNORMAL
TURBIDITY: CLEAR
URINE HGB: NEGATIVE
UROBILINOGEN, URINE: NORMAL
WBC UA: ABNORMAL /HPF (ref 0–5)
YEAST: ABNORMAL

## 2020-03-24 LAB
CULTURE: ABNORMAL
Lab: ABNORMAL
SPECIMEN DESCRIPTION: ABNORMAL